# Patient Record
Sex: MALE | Race: WHITE
[De-identification: names, ages, dates, MRNs, and addresses within clinical notes are randomized per-mention and may not be internally consistent; named-entity substitution may affect disease eponyms.]

---

## 2017-02-19 ENCOUNTER — HOSPITAL ENCOUNTER (EMERGENCY)
Dept: HOSPITAL 53 - M ED | Age: 42
Discharge: HOME | End: 2017-02-19
Payer: MEDICAID
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
## 2017-02-19 DIAGNOSIS — S93.622A: ICD-10-CM

## 2017-02-19 DIAGNOSIS — F17.210: ICD-10-CM

## 2017-02-19 DIAGNOSIS — S93.612A: Primary | ICD-10-CM
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
## 2017-02-19 NOTE — REP
Clinical:  Trauma.

 

Technique:  AP, lateral, bilateral oblique views left foot.

 

Comparison 08/19/2015.

 

Findings:  The osseous structures and joint spaces are intact and normal.  There

is no evidence for acute fracture or dislocation.  Surrounding soft tissues are

unremarkable.  No subcutaneous emphysema or radiodense foreign body. Lateral view

demonstrates small calcaneal heal spur.

 

Impression:

 No acute fracture or dislocation.

 

 

Signed by

Floyd Burnham MD 02/19/2017 03:02 P

## 2017-02-19 NOTE — EDDOCDS
Physician Documentation                                                                           

[facility]                                                                         

Name: Wiliam Martinez                                                                                

Age: 41 yrs                                                                                       

Sex: Male                                                                                         

: 1975                                                                                   

MRN: [id number]                                                                                     

Arrival Date: 2017                                                                          

Time: 14:31                                                                                       

Account#: [id number]                                                                              

Bed I6 /                                                                                        

Private MD: No Pcp                                                                                

Disposition:                                                                                      

17 15:35 Discharged to Home/Self Care. Impression: Sprain of tarsal ligament of left        

foot, Sprain of tarsometatarsal ligament of left foot.                                          

- Condition is Stable.                                                                            

- Discharge Instructions: Foot Sprain.                                                            

                                                                                                  

- Medication Reconciliation, Local Pharmacy Hours, Work Release Form - 1 day form.                

- Follow up: Orthopaedics, [facility]; When: Call to arrange an appointment; Reason:           

Further diagnostic work-up, Recheck today's complaints, Continuance of care.                    

- Problem is new.                                                                                 

- Symptoms are unchanged.                                                                         

                                                                                                  

                                                                                                  

                                                                                                  

Historical:                                                                                       

- Allergies: no known allergies;                                                                  

- Home Meds:                                                                                      

1. none                                                                                         

- PMHx: Stroke; Thyroid problem; hernia;                                                          

- PSHx: rt hand surgery;                                                                          

- Social history: Smoking status: Patient uses tobacco products, current every day                

smoker. No barriers to communication noted, The patient speaks fluent English, Speaks           

appropriately for age.                                                                          

- Family history: Not pertinent.                                                                  

- : The pt / caregiver states he / she is not on anticoagulants. Home medication list             

is obtained from the patient.                                                                   

- Exposure Risk Screening:: None identified.                                                      

                                                                                                  

                                                                                                  

Vital Signs:                                                                                      

                                                                                             

14:33  / 71; Pulse 98; Resp 18 S; Temp 97.8(O); Pulse Ox 99% on R/A; Weight 90.72 kg /  gr2 

      200 lbs (R); Height 5 ft. 9 in. (175.26 cm) (R); Pain 9/10;                                 

15:47  / 78; Pulse 91; Resp 18; Temp 97.3; Pulse Ox 99% ; Pain 10/10;                   pml 

14:33 Body Mass Index 29.53 (90.72 kg, 175.26 cm)                                             gr2 

                                                                                                  

MDM:                                                                                              

14:40 Foot, Complete Ordered.                                                                 EDMS

15:35 Ace Wrap ordered.                                                                       btw 

                                                                                                  

Signatures:                                                                                       

Dispatcher [employer]                           EDMS                                                 

Hanna Hargrove RN RN srm [place], [place], PA PA   btw                                                  

Shelia Flor RN RN   pml                                                  

                                                                                                  

**************************************************************************************************

MTDD

## 2017-02-19 NOTE — EDDOCDS
Nurse's Notes                                                                                     

[facility]                                                                         

Name: Wiliam Martinez                                                                                

Age: 41 yrs                                                                                       

Sex: Male                                                                                         

: 1975                                                                                   

MRN: [id number]                                                                                     

Arrival Date: 2017                                                                          

Time: 14:31                                                                                       

Account#: [id number]                                                                              

Bed I6 / 28                                                                                       

Private MD: No Pcp                                                                                

Diagnosis: Sprain of tarsal ligament of left foot;Sprain of tarsometatarsal ligament of left foot 

                                                                                                  

Presentation:                                                                                     

                                                                                             

14:36 Presenting complaint: Patient states: fell yesterday injuring left foot. Adult Sepsis   srm 

      Screening: The patient does not have new or worsening altered mentation. Patient's          

      respiratory rate is less than 22. Systolic blood pressure is greater than 100. Patient      

      has a qSOFA score of 0- Negative Sepsis Screen. Suicide/Homicide risk assessment- the       

      patient denies having any suicidal and/or homicidal ideations and does not present with     

      any other emotional, behavioral or mental health complaints.  Status: Patient       

      is not a  or  dependent. Transition of care: patient was not          

      received from another setting of care.                                                      

14:36 Acuity: ALEAH Level 4                                                                     srm 

14:36 Method Of Arrival: Walkin/Carried/Asstd                                                 srm 

                                                                                                  

Triage Assessment:                                                                                

14:37 General: Appears in no apparent distress, Behavior is appropriate for age, cooperative. srm 

      Pain: Pain currently is 10 out of 10 on a pain scale. HIV screening NA for this visit       

      Offered previously. Musculoskeletal: Circulation, motion, and sensation intact              

      Capillary refill < 3 seconds in left toes.                                                  

14:39 Musculoskeletal: no swelling or bruising noted to left foot.                            srm 

                                                                                                  

Historical:                                                                                       

- Allergies: no known allergies;                                                                  

- Home Meds:                                                                                      

1. none                                                                                         

- PMHx: Stroke; Thyroid problem; hernia;                                                          

- PSHx: rt hand surgery;                                                                          

- Social history: Smoking status: Patient uses tobacco products, current every day                

smoker. No barriers to communication noted, The patient speaks fluent English, Speaks           

appropriately for age.                                                                          

- Family history: Not pertinent.                                                                  

- : The pt / caregiver states he / she is not on anticoagulants. Home medication list             

is obtained from the patient.                                                                   

- Exposure Risk Screening:: None identified.                                                      

                                                                                                  

                                                                                                  

Screening:                                                                                        

15:47 Screening information is obtained from the patient. Fall risk: No risks identified.     pml 

      Assistance ADL's: requires no assistance with activities of daily living. Abuse/DV          

      Screen: The patient / caregiver reports he/she is: not in a situation that causes fear,     

      pain or injury. Nutritional screening: No deficits noted. Advance Directives:               

      Currently, there is no health care proxy. home support is adequate.                         

                                                                                                  

Assessment:                                                                                       

15:47 General: Appears in no apparent distress, comfortable, Behavior is appropriate for age, pml 

      cooperative. Pain: Location: left foot Pain currently is 10 out of 10 on a pain scale.      

      Neurological: Level of Consciousness is awake, alert, Oriented to person, place, time.      

      Cardiovascular: Capillary refill < 3 seconds. Respiratory: Airway is patent Respiratory     

      effort is even, unlabored, Respiratory pattern is regular, symmetrical. GI: Abdomen is      

      non- distended. Derm: Skin is pink, warm & dry. Musculoskeletal: Circulation, motion,     

      and sensation intact Capillary refill < 3 seconds No deformity noted Swelling absent.       

                                                                                                  

Vital Signs:                                                                                      

14:33  / 71; Pulse 98; Resp 18 S; Temp 97.8(O); Pulse Ox 99% on R/A; Weight 90.72 kg    gr2 

      (R); Height 5 ft. 9 in. (175.26 cm) (R); Pain 9/10;                                         

15:47  / 78; Pulse 91; Resp 18; Temp 97.3; Pulse Ox 99% ; Pain 10/10;                   pml 

14:33 Body Mass Index 29.53 (90.72 kg, 175.26 cm)                                             gr2 

                                                                                                  

Vitals:                                                                                           

14:33 Log In Time: 2017 at 14:33.                                                gr2 

                                                                                                  

ED Course:                                                                                        

14:32 Patient visited by Jolanta Martini.                                                   gr2 

14:32 Patient moved to Waiting                                                                gr2 

14:33 No Pcp is Private Physician.                                                            gr2 

14:35 Patient visited by Jolanta Martini.                                                   gr2 

14:35 Patient moved to Pre RCE                                                                gr2 

14:37 Triage Initiated                                                                        srm 

15:14 Raman Tanner PA is PHCP.                                                         btw 

15:14 Jamee Seymour MD is Attending Physician.                                               btw 

15:14 Patient moved to I                                                                btw 

15:34 Patient visited by Raman Tanner PA.                                              btw 

[facility]                                      [facility] 

15:47 The patient / caregiver is instructed regarding the plan of care and ED course. Patient pml 

      has correct armband on for positive identification. Bed in low position. Side rails up      

      X2.                                                                                         

15:48 Foot, Complete Returned.                                                                EDMS

15:49 No IV's were initiated during this patient's visit. No procedures done that require     pml 

      assistance.                                                                                 

                                                                                                  

Order Results:                                                                                    

                                                                                                  

Radiology Order: Foot, Complete                                                                   

      Test: Foot, Complete                                                                        

      REASON FOR EXAMINATION: Trauma; Clinical: Trauma.; ; Technique: AP, lateral, bilateral      

      oblique views left foot.; ; Comparison 2015.; ; Findings: The osseous structures      

      and joint spaces are intact and normal. There; is no evidence for acute fracture or         

      dislocation. Surrounding soft tissues are; unremarkable. No subcutaneous emphysema or       

      radiodense foreign body. Lateral view; demonstrates small calcaneal heal spur.; ;           

      Impression:; No acute fracture or dislocation.; ; ; Signed by; Floyd Burnham MD            

      2017 03:02 P;                                                                         

Outcome:                                                                                          

15:35 Discharge ordered by Provider.                                                          btw 

15:49 Discharge Assessment: Patient awake, alert and oriented x 3. No cognitive and/or        pml 

      functional deficits noted. Patient verbalized understanding of disposition                  

      instructions. patient administered narcotics - no. The following High Risk Discharge        

      criteria are identified: None. Discharged to home ambulatory. Condition: good               

      Condition: stable. Discharge instructions given to patient, Instructed on discharge         

      instructions, follow up and referral plans. Rest, Ice, Compression and Elevation.           

      Demonstrated understanding of instructions, Pt was receptive of discharge instructions/     

      teaching. No special radiology studies were completed. Property sent home with patient.     

15:50 Patient left the ED.                                                                    pml 

                                                                                                  

Signatures:                                                                                       

Dispatcher [employer]                           EDMS                                                 

Hanna Hargrove, RN                    Raman Harris PA PA   btw                                                  

Shelia Flor RN RN pml Raymond, Gainslee                            gr2                                                  

                                                                                                  

**************************************************************************************************

MTDLINA

## 2017-02-21 NOTE — EDDOCDS
Nurse's Notes                                                                                     

[facility]                                                                         

Name: Wiliam Martinez                                                                                

Age: 41 yrs                                                                                       

Sex: Male                                                                                         

: 1975                                                                                   

MRN: [id number]                                                                                     

Arrival Date: 2017                                                                          

Time: 14:31                                                                                       

Account#: [id number]                                                                              

Bed I6 / 28                                                                                       

Private MD: No Pcp                                                                                

Diagnosis: Sprain of tarsal ligament of left foot;Sprain of tarsometatarsal ligament of left foot 

                                                                                                  

Presentation:                                                                                     

                                                                                             

14:36 Presenting complaint: Patient states: fell yesterday injuring left foot. Adult Sepsis   srm 

      Screening: The patient does not have new or worsening altered mentation. Patient's          

      respiratory rate is less than 22. Systolic blood pressure is greater than 100. Patient      

      has a qSOFA score of 0- Negative Sepsis Screen. Suicide/Homicide risk assessment- the       

      patient denies having any suicidal and/or homicidal ideations and does not present with     

      any other emotional, behavioral or mental health complaints.  Status: Patient       

      is not a  or  dependent. Transition of care: patient was not          

      received from another setting of care.                                                      

14:36 Acuity: ALEAH Level 4                                                                     srm 

14:36 Method Of Arrival: Walkin/Carried/Asstd                                                 srm 

                                                                                                  

Triage Assessment:                                                                                

14:37 General: Appears in no apparent distress, Behavior is appropriate for age, cooperative. srm 

      Pain: Pain currently is 10 out of 10 on a pain scale. HIV screening NA for this visit       

      Offered previously. Musculoskeletal: Circulation, motion, and sensation intact              

      Capillary refill < 3 seconds in left toes.                                                  

14:39 Musculoskeletal: no swelling or bruising noted to left foot.                            srm 

                                                                                                  

Historical:                                                                                       

- Allergies: no known allergies;                                                                  

- Home Meds:                                                                                      

1. none                                                                                         

- PMHx: Stroke; Thyroid problem; hernia;                                                          

- PSHx: rt hand surgery;                                                                          

- Social history: Smoking status: Patient uses tobacco products, current every day                

smoker. No barriers to communication noted, The patient speaks fluent English, Speaks           

appropriately for age.                                                                          

- Family history: Not pertinent.                                                                  

- : The pt / caregiver states he / she is not on anticoagulants. Home medication list             

is obtained from the patient.                                                                   

- Exposure Risk Screening:: None identified.                                                      

                                                                                                  

                                                                                                  

Screening:                                                                                        

15:47 Screening information is obtained from the patient. Fall risk: No risks identified.     pml 

      Assistance ADL's: requires no assistance with activities of daily living. Abuse/DV          

      Screen: The patient / caregiver reports he/she is: not in a situation that causes fear,     

      pain or injury. Nutritional screening: No deficits noted. Advance Directives:               

      Currently, there is no health care proxy. home support is adequate.                         

                                                                                                  

Assessment:                                                                                       

15:47 General: Appears in no apparent distress, comfortable, Behavior is appropriate for age, pml 

      cooperative. Pain: Location: left foot Pain currently is 10 out of 10 on a pain scale.      

      Neurological: Level of Consciousness is awake, alert, Oriented to person, place, time.      

      Cardiovascular: Capillary refill < 3 seconds. Respiratory: Airway is patent Respiratory     

      effort is even, unlabored, Respiratory pattern is regular, symmetrical. GI: Abdomen is      

      non- distended. Derm: Skin is pink, warm & dry. Musculoskeletal: Circulation, motion,     

      and sensation intact Capillary refill < 3 seconds No deformity noted Swelling absent.       

                                                                                                  

Vital Signs:                                                                                      

14:33  / 71; Pulse 98; Resp 18 S; Temp 97.8(O); Pulse Ox 99% on R/A; Weight 90.72 kg    gr2 

      (R); Height 5 ft. 9 in. (175.26 cm) (R); Pain 9/10;                                         

15:47  / 78; Pulse 91; Resp 18; Temp 97.3; Pulse Ox 99% ; Pain 10/10;                   pml 

14:33 Body Mass Index 29.53 (90.72 kg, 175.26 cm)                                             gr2 

                                                                                                  

Vitals:                                                                                           

14:33 Log In Time: 2017 at 14:33.                                                gr2 

                                                                                                  

ED Course:                                                                                        

14:32 Patient visited by Jolanta Martini.                                                   gr2 

14:32 Patient moved to Waiting                                                                gr2 

14:33 No Pcp is Private Physician.                                                            gr2 

14:35 Patient visited by Jolanta Martini.                                                   gr2 

14:35 Patient moved to Pre RCE                                                                gr2 

14:37 Triage Initiated                                                                        srm 

15:14 Raman Tanner PA is PHCP.                                                         btw 

15:14 Jamee Seymour MD is Attending Physician.                                               btw 

15:14 Patient moved to I                                                                btw 

15:34 Patient visited by Raman Tanner PA.                                              btw 

[facility]                                      [facility] 

15:47 The patient / caregiver is instructed regarding the plan of care and ED course. Patient pml 

      has correct armband on for positive identification. Bed in low position. Side rails up      

      X2.                                                                                         

15:48 Foot, Complete Returned.                                                                EDMS

15:49 No IV's were initiated during this patient's visit. No procedures done that require     pml 

      assistance.                                                                                 

16:25 [facility] Payment Agreement was scanned into [employer] and attached to record.               [facility] 

21:19 T-Sheet-- Draft Copy was scanned into [employer] and attached to record.                   klr 

                                                                                                  

Order Results:                                                                                    

                                                                                                  

Radiology Order: Foot, Complete                                                                   

      Test: Foot, Complete                                                                        

      REASON FOR EXAMINATION: Trauma; Clinical: Trauma.; ; Technique: AP, lateral, bilateral      

      oblique views left foot.; ; Comparison 2015.; ; Findings: The osseous structures      

      and joint spaces are intact and normal. There; is no evidence for acute fracture or         

      dislocation. Surrounding soft tissues are; unremarkable. No subcutaneous emphysema or       

      radiodense foreign body. Lateral view; demonstrates small calcaneal heal spur.; ;           

      Impression:; No acute fracture or dislocation.; ; ; Signed by; Floyd Burnham MD            

      2017 03:02 P;                                                                         

Outcome:                                                                                          

15:35 Discharge ordered by Provider.                                                          [facility] 

15:49 Discharge Assessment: Patient awake, alert and oriented x 3. No cognitive and/or        pml 

      functional deficits noted. Patient verbalized understanding of disposition                  

      instructions. patient administered narcotics - no. The following High Risk Discharge        

      criteria are identified: None. Discharged to home ambulatory. Condition: good               

      Condition: stable. Discharge instructions given to patient, Instructed on discharge         

      instructions, follow up and referral plans. Rest, Ice, Compression and Elevation.           

      Demonstrated understanding of instructions, Pt was receptive of discharge instructions/     

      teaching. No special radiology studies were completed. Property sent home with patient.     

15:50 Patient left the ED.                                                                    pml 

                                                                                                  

Signatures:                                                                                       

Dispatcher [facility]                           EDMS                                                 

Hanna Hargrove RN RN srm Wolfenden, Brandon, PA PA   btw                                                  

Shelia Flor RN RN pml Raymond, Gainslee gr2                                                  

Courtney Valentin Kathie klr                                                  

                                                                                                  

**************************************************************************************************



*** Chart Complete ***
MTDD

## 2017-02-21 NOTE — EDDOCDS
Physician Documentation                                                                           

[facility]                                                                         

Name: Wiliam Martinez                                                                                

Age: 41 yrs                                                                                       

Sex: Male                                                                                         

: 1975                                                                                   

MRN: [id number]                                                                                     

Arrival Date: 2017                                                                          

Time: 14:31                                                                                       

Account#: [id number]                                                                              

Bed I6 /                                                                                        

Private MD: No Pcp                                                                                

Disposition:                                                                                      

17 15:35 Discharged to Home/Self Care. Impression: Sprain of tarsal ligament of left        

foot, Sprain of tarsometatarsal ligament of left foot.                                          

- Condition is Stable.                                                                            

- Discharge Instructions: Foot Sprain.                                                            

                                                                                                  

- Medication Reconciliation, Local Pharmacy Hours, Work Release Form - 1 day form.                

- Follow up: Orthopaedics, [facility]; When: Call to arrange an appointment; Reason:           

Further diagnostic work-up, Recheck today's complaints, Continuance of care.                    

- Problem is new.                                                                                 

- Symptoms are unchanged.                                                                         

                                                                                                  

                                                                                                  

                                                                                                  

Historical:                                                                                       

- Allergies: no known allergies;                                                                  

- Home Meds:                                                                                      

1. none                                                                                         

- PMHx: Stroke; Thyroid problem; hernia;                                                          

- PSHx: rt hand surgery;                                                                          

- Social history: Smoking status: Patient uses tobacco products, current every day                

smoker. No barriers to communication noted, The patient speaks fluent English, Speaks           

appropriately for age.                                                                          

- Family history: Not pertinent.                                                                  

- : The pt / caregiver states he / she is not on anticoagulants. Home medication list             

is obtained from the patient.                                                                   

- Exposure Risk Screening:: None identified.                                                      

                                                                                                  

                                                                                                  

Vital Signs:                                                                                      

                                                                                             

14:33  / 71; Pulse 98; Resp 18 S; Temp 97.8(O); Pulse Ox 99% on R/A; Weight 90.72 kg /  gr2 

      200 lbs (R); Height 5 ft. 9 in. (175.26 cm) (R); Pain 9/10;                                 

15:47  / 78; Pulse 91; Resp 18; Temp 97.3; Pulse Ox 99% ; Pain 10/10;                   pml 

14:33 Body Mass Index 29.53 (90.72 kg, 175.26 cm)                                             gr2 

                                                                                                  

MDM:                                                                                              

14:40 Foot, Complete Ordered.                                                                 EDMS

15:35 Ace Wrap ordered.                                                                       btw 

16:25 [facility] Payment Agreement was scanned into [employer] and attached to record.               b 

16:25 Financial registration complete.                                                        gjb 

21:19 T-Sheet-- Draft Copy was scanned into [employer] and attached to record.                   klr 

                                                                                                  

Signatures:                                                                                       

Dispatcher [employer]                           Hanna Santiago RN RN srm Wolfenden, Brandon, PA PA btw Quay, Paulina, RN RN pml Beck, Gabriela gjb Redder, Kathie klr                                                  

                                                                                                  

The chart was reviewed and I authenticate all verbal orders and agree with the evaluation and 
treatment provided.Attachments:

16:25 [facility] Payment Agreement                                                                gjfred 

21:19 T-Sheet-- Draft Copy                                                                    klr 

                                                                                                  

**************************************************************************************************



*** Chart Complete ***
MTDD

## 2017-02-21 NOTE — EDDOCDS
Physician Documentation                                                                           

[facility]                                                                         

Name: Wiliam Martinez                                                                                

Age: 41 yrs                                                                                       

Sex: Male                                                                                         

: 1975                                                                                   

MRN: [id number]                                                                                     

Arrival Date: 2017                                                                          

Time: 14:31                                                                                       

Account#: [id number]                                                                              

Bed I6 /                                                                                        

Private MD: No Pcp                                                                                

Disposition:                                                                                      

17 15:35 Discharged to Home/Self Care. Impression: Sprain of tarsal ligament of left        

foot, Sprain of tarsometatarsal ligament of left foot.                                          

- Condition is Stable.                                                                            

- Discharge Instructions: Foot Sprain.                                                            

                                                                                                  

- Medication Reconciliation, Local Pharmacy Hours, Work Release Form - 1 day form.                

- Follow up: Orthopaedics, [facility]; When: Call to arrange an appointment; Reason:           

Further diagnostic work-up, Recheck today's complaints, Continuance of care.                    

- Problem is new.                                                                                 

- Symptoms are unchanged.                                                                         

                                                                                                  

                                                                                                  

                                                                                                  

Historical:                                                                                       

- Allergies: no known allergies;                                                                  

- Home Meds:                                                                                      

1. none                                                                                         

- PMHx: Stroke; Thyroid problem; hernia;                                                          

- PSHx: rt hand surgery;                                                                          

- Social history: Smoking status: Patient uses tobacco products, current every day                

smoker. No barriers to communication noted, The patient speaks fluent English, Speaks           

appropriately for age.                                                                          

- Family history: Not pertinent.                                                                  

- : The pt / caregiver states he / she is not on anticoagulants. Home medication list             

is obtained from the patient.                                                                   

- Exposure Risk Screening:: None identified.                                                      

                                                                                                  

                                                                                                  

Vital Signs:                                                                                      

                                                                                             

14:33  / 71; Pulse 98; Resp 18 S; Temp 97.8(O); Pulse Ox 99% on R/A; Weight 90.72 kg /  gr2 

      200 lbs (R); Height 5 ft. 9 in. (175.26 cm) (R); Pain 9/10;                                 

15:47  / 78; Pulse 91; Resp 18; Temp 97.3; Pulse Ox 99% ; Pain 10/10;                   pml 

14:33 Body Mass Index 29.53 (90.72 kg, 175.26 cm)                                             gr2 

                                                                                                  

MDM:                                                                                              

14:40 Foot, Complete Ordered.                                                                 EDMS

15:35 Ace Wrap ordered.                                                                       btw 

16:25 [facility] Payment Agreement was scanned into [employer] and attached to record.               b 

16:25 Financial registration complete.                                                        gjb 

21:19 T-Sheet-- Draft Copy was scanned into [employer] and attached to record.                   klr 

                                                                                                  

Signatures:                                                                                       

Dispatcher [employer]                           Hanna Santiago RN RN srm Wolfenden, Brandon, PA PA btw Quay, Paulina, RN RN pml Beck, Gabriela gjb Redder, Kathie klr                                                  

                                                                                                  

The chart was reviewed and I authenticate all verbal orders and agree with the evaluation and 
treatment provided.Attachments:

16:25 [facility] Payment Agreement                                                                gjfred 

21:19 T-Sheet-- Draft Copy                                                                    klr 

                                                                                                  

**************************************************************************************************



*** Chart Complete ***
MTDD

## 2018-08-31 ENCOUNTER — HOSPITAL ENCOUNTER (EMERGENCY)
Dept: HOSPITAL 53 - M ED | Age: 43
Discharge: HOME | End: 2018-08-31
Payer: SELF-PAY
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
## 2018-08-31 DIAGNOSIS — Z98.890: ICD-10-CM
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
## 2018-08-31 DIAGNOSIS — F17.210: ICD-10-CM

## 2018-08-31 DIAGNOSIS — S63.121A: Primary | ICD-10-CM

## 2018-08-31 LAB
ANION GAP: 5 MEQ/L (ref 8–16)
BASO #: 0.1 10^3/UL (ref 0–0.2)
BASO %: 0.7 % (ref 0–1)
BLOOD UREA NITROGEN: 12 MG/DL (ref 7–18)
CALCIUM LEVEL: 8.9 MG/DL (ref 8.5–10.1)
CARBON DIOXIDE LEVEL: 30 MEQ/L (ref 21–32)
CHLORIDE LEVEL: 107 MEQ/L (ref 98–107)
CREATININE FOR GFR: 1.15 MG/DL (ref 0.7–1.3)
EOS #: 0.2 10^3/UL (ref 0–0.5)
EOSINOPHIL NFR BLD AUTO: 1.9 % (ref 0–3)
GFR SERPL CREATININE-BSD FRML MDRD: > 60 ML/MIN/{1.73_M2} (ref 60–?)
GLUCOSE, FASTING: 76 MG/DL (ref 70–100)
HEMATOCRIT: 43.6 % (ref 42–52)
HEMOGLOBIN: 14.6 G/DL (ref 13.5–17.5)
IMMATURE GRANULOCYTE %: 0.2 % (ref 0–3)
LYMPH #: 3 10^3/UL (ref 1.5–4.5)
LYMPH %: 31.4 % (ref 24–44)
MEAN CORPUSCULAR HEMOGLOBIN: 31.7 PG (ref 27–33)
MEAN CORPUSCULAR HGB CONC: 33.5 G/DL (ref 32–36.5)
MEAN CORPUSCULAR VOLUME: 94.8 FL (ref 80–96)
MONO #: 1.1 10^3/UL (ref 0–0.8)
MONO %: 11.2 % (ref 0–5)
NEUTROPHILS #: 5.2 10^3/UL (ref 1.8–7.7)
NEUTROPHILS %: 54.6 % (ref 36–66)
NRBC BLD AUTO-RTO: 0 % (ref 0–0)
PLATELET COUNT, AUTOMATED: 258 10^3/UL (ref 150–450)
POTASSIUM SERUM: 3.7 MEQ/L (ref 3.5–5.1)
RED BLOOD COUNT: 4.6 10^6/UL (ref 4.3–6.1)
RED CELL DISTRIBUTION WIDTH: 12.5 % (ref 11.5–14.5)
SODIUM LEVEL: 142 MEQ/L (ref 136–145)
WHITE BLOOD COUNT: 9.6 10^3/UL (ref 4–10)

## 2018-08-31 RX ADMIN — MORPHINE SULFATE 1 MG: 4 INJECTION INTRAVENOUS at 20:13

## 2018-08-31 RX ADMIN — HYDROCODONE BITARTRATE AND ACETAMINOPHEN 1 TAB: 5; 325 TABLET ORAL at 21:45

## 2018-08-31 RX ADMIN — LIDOCAINE HYDROCHLORIDE 1 ML: 20 INJECTION, SOLUTION INFILTRATION; PERINEURAL at 20:14

## 2018-08-31 RX ADMIN — CEFAZOLIN SODIUM 1 MLS/HR: 1 INJECTION, POWDER, FOR SOLUTION INTRAMUSCULAR; INTRAVENOUS at 19:21

## 2019-02-26 ENCOUNTER — HOSPITAL ENCOUNTER (OUTPATIENT)
Dept: HOSPITAL 53 - M LAB REF | Age: 44
End: 2019-02-26
Attending: FAMILY MEDICINE
Payer: MEDICAID

## 2019-02-26 DIAGNOSIS — Z00.01: ICD-10-CM

## 2019-02-26 DIAGNOSIS — Z86.73: Primary | ICD-10-CM

## 2019-02-26 LAB
ALBUMIN SERPL BCG-MCNC: 4 GM/DL (ref 3.2–5.2)
ALT SERPL W P-5'-P-CCNC: 41 U/L (ref 12–78)
BASOPHILS # BLD AUTO: 0.1 10^3/UL (ref 0–0.2)
BASOPHILS NFR BLD AUTO: 0.8 % (ref 0–1)
BILIRUB SERPL-MCNC: 0.2 MG/DL (ref 0.2–1)
BUN SERPL-MCNC: 14 MG/DL (ref 7–18)
CALCIUM SERPL-MCNC: 8.7 MG/DL (ref 8.5–10.1)
CHLORIDE SERPL-SCNC: 106 MEQ/L (ref 98–107)
CHOLEST SERPL-MCNC: 222 MG/DL (ref ?–200)
CHOLEST/HDLC SERPL: 5.04 {RATIO} (ref ?–5)
CO2 SERPL-SCNC: 28 MEQ/L (ref 21–32)
CREAT SERPL-MCNC: 0.89 MG/DL (ref 0.7–1.3)
EOSINOPHIL # BLD AUTO: 0.2 10^3/UL (ref 0–0.5)
EOSINOPHIL NFR BLD AUTO: 1.7 % (ref 0–3)
EST. AVERAGE GLUCOSE BLD GHB EST-MCNC: 120 MG/DL (ref 60–110)
GFR SERPL CREATININE-BSD FRML MDRD: > 60 ML/MIN/{1.73_M2} (ref 60–?)
GLUCOSE SERPL-MCNC: 89 MG/DL (ref 70–100)
HCT VFR BLD AUTO: 53.1 % (ref 42–52)
HDLC SERPL-MCNC: 44 MG/DL (ref 40–?)
HGB BLD-MCNC: 17.8 G/DL (ref 13.5–17.5)
LDLC SERPL CALC-MCNC: 129 MG/DL (ref ?–100)
LYMPHOCYTES # BLD AUTO: 3.2 10^3/UL (ref 1.5–4.5)
LYMPHOCYTES NFR BLD AUTO: 29 % (ref 24–44)
MCH RBC QN AUTO: 30.3 PG (ref 27–33)
MCHC RBC AUTO-ENTMCNC: 33.5 G/DL (ref 32–36.5)
MCV RBC AUTO: 90.5 FL (ref 80–96)
MONOCYTES # BLD AUTO: 0.9 10^3/UL (ref 0–0.8)
MONOCYTES NFR BLD AUTO: 7.9 % (ref 0–5)
NEUTROPHILS # BLD AUTO: 6.6 10^3/UL (ref 1.8–7.7)
NEUTROPHILS NFR BLD AUTO: 60.2 % (ref 36–66)
NONHDLC SERPL-MCNC: 178 MG/DL
PLATELET # BLD AUTO: 329 10^3/UL (ref 150–450)
POTASSIUM SERPL-SCNC: 4.8 MEQ/L (ref 3.5–5.1)
PROT SERPL-MCNC: 8.3 GM/DL (ref 6.4–8.2)
RBC # BLD AUTO: 5.87 10^6/UL (ref 4.3–6.1)
SODIUM SERPL-SCNC: 139 MEQ/L (ref 136–145)
T4 FREE SERPL-MCNC: 0.92 NG/DL (ref 0.76–1.46)
TRIGL SERPL-MCNC: 247 MG/DL (ref ?–150)
TSH SERPL DL<=0.005 MIU/L-ACNC: 2.11 UIU/ML (ref 0.36–3.74)
WBC # BLD AUTO: 10.9 10^3/UL (ref 4–10)

## 2019-02-27 LAB — 25(OH)D3 SERPL-MCNC: 14.5 NG/ML (ref 30–100)

## 2019-03-01 LAB
B BURGDOR IGG+IGM SER-ACNC: <0.91 ISR (ref 0–0.9)
B BURGDOR IGM SER IA-ACNC: <0.8 INDEX (ref 0–0.79)

## 2021-02-11 ENCOUNTER — HOSPITAL ENCOUNTER (EMERGENCY)
Dept: HOSPITAL 53 - M ED | Age: 46
LOS: 1 days | Discharge: HOME | End: 2021-02-12
Payer: COMMERCIAL

## 2021-02-11 VITALS — BODY MASS INDEX: 30.04 KG/M2 | HEIGHT: 69 IN | WEIGHT: 202.83 LBS

## 2021-02-11 DIAGNOSIS — F41.9: ICD-10-CM

## 2021-02-11 DIAGNOSIS — F33.9: ICD-10-CM

## 2021-02-11 DIAGNOSIS — F12.20: ICD-10-CM

## 2021-02-11 DIAGNOSIS — J20.9: Primary | ICD-10-CM

## 2021-02-11 DIAGNOSIS — J45.909: ICD-10-CM

## 2021-02-11 DIAGNOSIS — F17.210: ICD-10-CM

## 2021-02-11 NOTE — CCD
Patient Summary Document

                             Created on: 2020



Wiliam Martinez

External Reference #: [id number]

: 1975

Sex: Male



Demographics





                          Address                   [address] [place] [address]

 

                          Home Phone                [phone]

 

                          Preferred Language        Unknown

 

                          Marital Status            Unknown

 

                          Baptism Affiliation     Unknown

 

                          Race                      Unknown

 

                          Ethnic Group              Unknown





Author





                          Author                    Wiliam Gibson

 

                          Organization              Unknown

 

                          Address                   [address]  [address]



 

                          Phone                     [phone]







Care Team Providers





                    Care Team Member Name Role                Phone

 

                    Bill Gibson     PCP                 [phone]







Chief Complaint and Reason for Visit





                                        Chief Complaint

 

                                         







Allergies, Adverse Reactions, Alerts





        Concept Allergy Name Reaction Severity Onset Date Status  Documentation 

Date 

Phone Number Npid         Taxonomy Code Taxonomy Desc Author Last Name Author Fi

rst Name

                                        Concept Type

 

     073320 nkda                Active 2018                               

RXNORM







Problem List





          Concept   Problem Description Status    Start Date Created Date Resolv

ed Date Snomed 

Code

 

                F28             Other Specified Schizophrenia Spectrum and Other

 Psychotic Disorder Active           

                    2020                               

 

          F17.200   Tobacco Use Disorder, Moderate Active              

0            

 

          F12.10    Cannabis Use Disorder, Mild Active              2020  

          







Medications





                                        No Data in Section







Social History





                Social History Element Description     Concept         Effective

 Date

 

                Smoking Status  Unknown if ever smoked [id number]       [id number]







Immunizations





                                        No Data in Section







Vital Signs





                                        No Data in Section







Procedures





           Date       Concept Id Description Targeted Site Concept Targeted Site

 Concept Type

 

           2020 [id number]      Brief Individual Psychotherapy - 30 min        

               CPT







                                        Patient has no history of implantable de

vices







Encounters





          Encounter Start Date End Date  Encounter Type Description Diagnosis Di

agnosis 

Desc       Location   Author First Name Author Last Name Npid       Taxonomy Cod

e Taxonomy 

Desc       Phone Number Location Addr1 Location Addr2 Location [facility] Location [facility] 

Location [facility]

 

          [id number]    2020 [address]     Brief Individual Psychothera

py - 30 min F28       

Other Specified Schizophrenia Spectrum and Other Psychotic Disorder [facility] 

Clinic [facility]     [id number] [id number] Social Wo

rker 

[id number]   [address]           [phone]







Plan of Treatment 





                                        No Data in Section



                                        



Lab Results





                                        No Data in Section







Instructions





                                        No Data in Section







Insurance Providers





                Insurance Id    Policy Effective Date Policy Thru Date Company N

[employer]

 

                [id number]        2020                      [employer]

## 2021-02-11 NOTE — CCD
Patient Summary Document

                             Created on: 2021



Wiliam Martinez

External Reference #: [id number]

: 1975

Sex: Male



Demographics





                          Address                   [address] [place] [address]  [address]

 

                          Home Phone                [phone]

 

                          Preferred Language        Unknown

 

                          Marital Status            Unknown

 

                          Quaker Affiliation     Unknown

 

                          Race                      Unknown

 

                          Ethnic Group              Unknown





Author





                          Author                    Wiliam Gibson

 

                          Organization              Unknown

 

                          Address                   [address]  [address]



 

                          Phone                     [phone]







Care Team Providers





                    Care Team Member Name Role                Phone

 

                    Bill Gibson     PCP                 [phone]







Allergies, Adverse Reactions, Alerts





        Concept Allergy Name Reaction Severity Onset Date Status  Documentation 

Date 

Phone Number Npid         Taxonomy Code Taxonomy Desc Author Last Name Author Stephen

rst Name

                                        Concept Type

 

     367912 nkda                Active 2018                               

RXNORM







Problem List





          Concept   Problem Description Status    Start Date Created Date Resolv

ed Date Snomed 

Code

 

                F28             Other Specified Schizophrenia Spectrum and Other

 Psychotic Disorder Active           

                    2021                               

 

          F17.200   Tobacco Use Disorder, Moderate Active                          

 

          F12.10    Cannabis Use Disorder, Mild Active              2021  

          







Medications





                                        No Data in Section







Social History





                Social History Element Description     Concept         Effective

 Date

 

                Smoking Status  Unknown if ever smoked [id number]       [id number]







Immunizations





                                        No Data in Section







Vital Signs





                                        No Data in Section







Procedures





           Date       Concept Id Description Targeted Site Concept Targeted Site

 Concept Type

 

           2021 [id number]      Extended Individual Psychotherapy  - 45  min   

                    CPT







                                        Patient has no history of implantable de

vices







Encounters





          Encounter Start Date End Date  Encounter Type Description Diagnosis Di

agnosis 

Desc       Location   Author First Name Author Last Name Npid       Taxonomy Cod

e Taxonomy 

Desc       Phone Number Location Addr1 Location Addr2 Location [facility] Location Sta

 

Location [facility]

 

           [id number]     2021 [address]      Extended Individual Psych

otherapy  - 45  min 

F28                       Other Specified Schizophrenia Spectrum and Other Psych

otic Disorder 

[facility]       [phone]   1041

[id number]   Social

Worker     [phone] [address]         1

[phone]







Plan of Treatment 





                                        No Data in Section



                                        



Lab Results





                                        No Data in Section







Instructions





                                        No Data in Section







Insurance Providers





                Insurance Id    Policy Effective Date Policy Thru Date Company N

[employer]vin

 

                [id number]       2021                      [employer]

ID MANAGED

## 2021-02-11 NOTE — CCD
Summarization Of Episode

                             Created on: 2021



JOSIE KWOK

External Reference #: [id number]

: 1975

Sex: Male



Demographics





                          Address                   [address] [place] [address]

 

                          Home Phone                [phone]

 

                          Preferred Language        English

 

                          Marital Status            Single

 

                          Christian Affiliation     [place]

 

                          Race                      White

 

                          Ethnic Group              Not  or 





Author





                          Author                    [facility]

 

                          Organization              [facility]

 

                          Address                   Unknown

 

                          Phone                     Unavailable







Support





                Name            Relationship    Address         Phone

 

                    Judie José MD   Next Of Kin         [address] [place] [address]                    [phone]

 

                    Jose Blancas MD    Next Of Kin         [address] [place] [address]                    [phone]

 

                    Jing Mendoza DDS   Next Of Kin         [address] [place], [place] [id number] [phone]

 

                    [employer] Next Of Kin         [address] [place] [address]                    [phone]

 

                UE              Next Of Kin     Unknown         Unavailable

 

                    PAOLA HOLLAND      Next Of Kin         [address] [place] [address]                    [phone]







Care Team Providers





                    Care Team Member Name Role                Phone

 

                    LaBarge,  Bill    Unavailable         [phone]

 

                    MAURICIO JOSÉ MD  Unavailable         Unavailable

 

                    MAURICIO JOSÉ MD  Unavailable         Unavailable

 

                    MAURICIO JOSÉ MD  Unavailable         Unavailable

 

                    MAURICIO JOSÉ MD  Unavailable         Unavailable

 

                    MAURICIO JOSÉ MD  Unavailable         Unavailable

 

                    MAURICIO JOSÉ MD  Unavailable         Unavailable

 

                    MAURICIO JOSÉ MD  Unavailable         Unavailable

 

                    MAURICIO JOSÉ MD  Unavailable         Unavailable

 

                    MAURICIO JOSÉ MD  Unavailable         Unavailable

 

                    MAURICIO JOSÉ MD  Unavailable         Unavailable

 

                    MAURICIO JOSÉ MD  Unavailable         Unavailable

 

                    MAURICIO JOSÉ MD  Unavailable         Unavailable

 

                    MAURICIO JOSÉ MD  Unavailable         Unavailable

 

                    MAURICIO JOSÉ MD  Unavailable         Unavailable

 

                    MAURICIO JOSÉ MD  Unavailable         Unavailable

 

                    MAURICIO JOSÉ MD  Unavailable         Unavailable

 

                    MAURICIO JOSÉ MD  Unavailable         Unavailable

 

                    MAURICIO JOSÉ MD  Unavailable         Unavailable

 

                    MAURICIO JOSÉ MD  Unavailable         Unavailable

 

                    MAURICIO JOSÉ MD  Unavailable         Unavailable

 

                    MAURICIO JOSÉ MD  Unavailable         Unavailable

 

                    MAURICIO JOSÉ MD  Unavailable         Unavailable

 

                    MAURICIO JSOÉ MD  Unavailable         Unavailable

 

                    MAURICIO JOSÉ MD  Unavailable         Unavailable

 

                    MAURICIO JOSÉ MD  Unavailable         Unavailable

 

                    MAURICIO JOSÉ MD  Unavailable         Unavailable

 

                    MAURICIO JOSÉ MD  Unavailable         Unavailable

 

                    MAURICIO JOSÉ MD  Unavailable         Unavailable

 

                    MAURICIO JOSÉ MD  Unavailable         Unavailable

 

                    MAURICIO JOSÉ MD  Unavailable         Unavailable

 

                    MAURICIO JOSÉ MD  Unavailable         Unavailable

 

                    MAURICIO JOSÉ MD  Unavailable         Unavailable

 

                    MAURICIO JOSÉ MD  Unavailable         Unavailable

 

                    MAURICIO JOSÉ MD  Unavailable         Unavailable

 

                    MAURICIO JOSÉ MD  Unavailable         Unavailable

 

                    MAURICIO JOSÉ MD  Unavailable         Unavailable

 

                    MAURICIO JOSÉ MD  Unavailable         Unavailable

 

                    MAURICIO JOSÉ MD  Unavailable         Unavailable

 

                    JANNETTEMAURICIO DOYLE MD  Unavailable         Unavailable

 

                    JANNETTEMAURICIO DOYLE MD  Unavailable         Unavailable

 

                    MAURICIO JOSÉ MD  Unavailable         Unavailable

 

                    MAURICIO JOSÉ MD  Unavailable         Unavailable

 

                    MAURICIO JOSÉ MD  Unavailable         Unavailable

 

                    MAURICIO JOSÉ MD  Unavailable         Unavailable

 

                    MAURICIO JOSÉ MD  Unavailable         Unavailable

 

                    MAURICIO JOSÉ MD  Unavailable         Unavailable

 

                    MAURICIO JOSÉ MD  Unavailable         Unavailable

 

                    MAURICIO JOSÉ MD  Unavailable         Unavailable

 

                    MAURICIO JOSÉ MD  Unavailable         Unavailable

 

                    MAURICIO JOSÉ MD  Unavailable         Unavailable

 

                    MAURICIO JOÉS MD  Unavailable         Unavailable

 

                    MAURICIO JOSÉ MD  Unavailable         Unavailable

 

                    MAURICIO JOSÉ MD  Unavailable         Unavailable

 

                    MAURICIO JOSÉ MD  Unavailable         Unavailable

 

                    MAURICIO JOSÉ MD  Unavailable         Unavailable

 

                    MAURICIO JOSÉ MD  Unavailable         Unavailable

 

                    MAURICIO JOSÉ MD  Unavailable         Unavailable

 

                    MAURICIO JOSÉ MD  Unavailable         Unavailable

 

                    MAURICIO JOSÉ MD  Unavailable         Unavailable

 

                    MAURICIO JOSÉ MD  Unavailable         Unavailable

 

                    MAURICIO JOSÉ MD  Unavailable         Unavailable

 

                    MAURICIO JOSÉ MD  Unavailable         Unavailable

 

                    MAURICIO JOSÉ MD  Unavailable         Unavailable

 

                    MAURICIO JOSÉ MD  Unavailable         Unavailable

 

                    MAURICIO JOSÉ MD  Unavailable         Unavailable

 

                    MAURICIO JOSÉ MD  Unavailable         Unavailable

 

                    MAURICIO JOSÉ MD  Unavailable         Unavailable

 

                    MAURICIO JOSÉ MD  Unavailable         Unavailable

 

                    MAURICIO JOSÉ MD  Unavailable         Unavailable

 

                    MAURICIO JOSÉ MD  Unavailable         Unavailable

 

                    MAURICIO JOSÉ MD  Unavailable         Unavailable

 

                    MAURICIO JOSÉ MD  Unavailable         Unavailable

 

                    MAURICIO JOSÉ MD  Unavailable         Unavailable

 

                    MAURICIO JOSÉ MD  Unavailable         Unavailable

 

                    MAURICIO JOSÉ MD  Unavailable         Unavailable

 

                    MAURICIO JOSÉ MD  Unavailable         Unavailable

 

                    MAURICIO JOSÉ MD  Unavailable         Unavailable

 

                    MAURICIO JOSÉ MD  Unavailable         Unavailable

 

                    MAURICIO JOSÉ MD  Unavailable         Unavailable

 

                    MAURICIO JOSÉ MD  Unavailable         Unavailable

 

                    MAURICIO JOSÉ MD  Unavailable         Unavailable

 

                    MAURICIO JOSÉ MD  Unavailable         Unavailable

 

                    MAURICIO JOSÉ MD  Unavailable         Unavailable

 

                    Stephanie Capellan      Unavailable         [phone]



                                  



Re-disclosure Warning

          The records that you are about to access may contain information from 
federally-assisted alcohol or drug abuse programs. If such information is 
present, then the following federally mandated warning applies: This information
has been disclosed to you from records protected by federal confidentiality 
rules (42 CFR part 2). The federal rules prohibit you from making any further 
disclosure of this information unless further disclosure is expressly permitted 
by the written consent of the person to whom it pertains or as otherwise 
permitted by 42 CFR part 2. A general authorization for the release of medical 
or other information is NOT sufficient for this purpose. The Federal rules 
restrict any use of the information to criminally investigate or prosecute any 
alcohol or drug abuse patient.The records that you are about to access may 
contain highly sensitive health information, the redisclosure of which is 
protected by Article 27-F of the [facility] Public Health law. If you 
continue you may have access to information: Regarding HIV / AIDS; Provided by 
facilities licensed or operated by the [facility] Office of Mental Health; 
or Provided by the [facility] Office for People With Developmental 
Disabilities. If such information is present, then the following [place] 
mandated warning applies: This information has been disclosed to you from 
confidential records which are protected by state law. State law prohibits you 
from making any further disclosure of this information without the specific 
written consent of the person to whom it pertains, or as otherwise permitted by 
law. Any unauthorized further disclosure in violation of state law may result in
a fine or senior living sentence or both. A general authorization for the release of 
medical or other information is NOT sufficient authorization for further disc
losure.                                                                         
    



Allergies and Adverse Reactions

          



           Type       Description Substance  Reaction   Status     Data Source(s

)

 

                    Propensity to adverse reactions to substance nkda           

     24 HR Bupropion Hydrochloride 

150 MG Extended Release Oral Tablet                     Active              [facility]

[facility] ([facility])



                                                                                
       



Family History

          



             Family Member Name Family Member Gender Family Member Status Date o

f Status 

Description                             Data Source(s)

 

           Unknown    Male       Problem                          [facility] ([facility]



                                                                                
       



Encounters

          



           Encounter  Providers  Location   Date       Indications Data Source(s

)

 

                    Extended Individual Psychotherapy  - 45  min Attender: Klever Gibson [facility]         2021 01:15:00 AM EST - 2021 01:15:00 AM EST     

                [facility] 

([facility])

 

                      Attender: Bill [facility]            2021 12:00:00 AM

 EST            [facility] ([facility])

 

                    Brief Individual Psychotherapy - 30 min Attender: Bill greer [facility]         01/15/2021 02:30:00 AM EST - 01/15/2021 02:30:00 AM EST     

                [facility] 

([facility])

 

                      Attender: Bill [facility]            01/15/2021 12:00:00 AM

 EST            [facility] ([facility])

 

                    Psychiatric Diagnostic Evaluation (Non-Medical) Attender: Suzy villanueva Paige 

[facility] 2021 09:00:00 AM EST - 2021 09:00:00 AM EST   

                  

[facility] ([facility])

 

                      Attender: Bill [facility]            2021 12:00:00 AM

 EST            [facility] ([facility])

 

                    [facility] - 30 min [facility]         2020 02:00:00 AM EST - 2020 02:00:00 AM EST     

                [facility] 

([facility])

 

                      Attender: Bill [facility]            2020 12:00:00 AM

 EST            [facility] ([facility])

 

                    Brief Individual Psychotherapy - 30 min Attender: Stephanie sousa [facility]                03/10/2020 02:00:00 AM EDT - 03/10/2020 02:00:00 AM EDT     

                [facility] ([facility])

 

                      Attender: Stephanie Capellan            03/10/2020 12:00:00 AM E

DT            [facility] ([facility])

 

           Outpatient Attender: JUDIE MELGAR         2020 09:01:13 P

M EST            [facility]

 

                      Attender: Stephanie Capellan            2020 12:00:00 AM E

ST            [facility] ([facility])

 

                    Brief Individual Psychotherapy - 30 min Attender: Stephanie sousa [facility]                2020 12:15:00 PM EST - 2020 12:15:00 PM EST     

                [facility] ([facility])



                                                                                
                                                                                
                                              



Medications

          



          Medication Brand Name Start Date Product Form Dose      Route     Admi

nistrative 

Instructions Pharmacy Instructions Status     Indications Reaction   Description

 Data 

Source(s)

 

                    Sodium Chloride 0.111 MEQ/ML Nasal Solution Nasal Moist     

    2020 12:00:00 AM 

EST                                       active                      [facility] ([facility])

 

                                        Acetaminophen 325 MG / Chlorpheniramine 

Maleate 2 MG / Phenylephrine 

Hydrochloride 5 MG Oral Tablet Medicidin-D 2020 12:00:00 AM EST           

                              

completed                                                       [facility] ([facility])

 

                    Vitamin A 24282 UNT / Vitamin D 400 UNT Oral Capsule A & D  

             2020 12:00:00 

AM EST                                    active                      [facility] ([facility])

 

           Clotrimazole 10 MG/ML Topical Cream Anti-Fungal 2020 12:00:00 A

M EST                        

                              active                                  [facility] ([facility])

 

          Amoxicillin 875 MG Oral Tablet Amoxicillin 2020 12:00:00 AM EST 

                    ORAL       

                      active                                      [facility] ([facility])

 

                Escitalopram 10 MG Oral Tablet Escitalopram Oxalate 2020 1

2:00:00 AM EST  

                ORAL                    active                          [facility] (

[facility])

 

        topiramate 50 MG Oral Tablet Topiramate 2020 12:00:00 AM EST      

           ORAL                    

active                                                          [facility] ([facility])

 

           Cholecalciferol 2000 UNT Oral Tablet Vitamin D3 2020 12:00:00 A

M EST                       

ORAL                          active                                  [facility] ([facility])

 

                atorvastatin 10 MG Oral Tablet Atorvastatin Calcium 2020 1

2:00:00 AM EST  

                ORAL                    active                          [facility] (

[facility])

 

                    Clonidine Hydrochloride 0.1 MG Oral Tablet Clonidine HCL    

   2020 12:00:00 AM 

EST                  ORAL                 active                      [facility] ([facility])

 

        Mirtazapine 30 MG Oral Tablet Mirtazapine 2020 12:00:00 AM EST    

             ORAL            

             active                                              [facility] ([facility])

 

                    12 HR Carbamazepine 100 MG Extended Release Oral Capsule Car

bamazepine ER    

2020 12:00:00 AM EST               ORAL                 active            

          [facility] ([facility])



                                                                                
                                                                                
                



Insurance Providers

          



             Payer name   Policy type / Coverage type Policy ID    Covered party

 ID Covered 

party's relationship to muñoz Policy Muñoz             Plan Information

 

          [place] [place] [facility] [place]           [id number]           [place]           

       [id number]

 

          [facility] P         [id number]           S      

             [id number]

 

          Medicaid  S         [id number]            S                   [id number]

 

          Managed [facility] P         [id number]           S      

             [id number]

 

          Managed Care [facility] P         [id number]           S      

             [id number]

 

          [employer] [facility]           [id number]           SP           

       [id number]

 

          MEDICAID            [id number]            SP                  [id number]

 

          Managed Care [facility] P         [id number]           S      

             [id number]

 

          Medicaid [place] Medicaid  [id number]            Self                [id number]

 

          SELF PAY ONLY           [id number]            SP                  [id number]

00

 

          [facility] O         [id number]           S              

     [id number]

 

          MEDICAID  M         [id number]            S                   [id number]

 

          SELF PAY            UNAVAILABLE           SP                  UNAVAILA

BLE



                                                                                
                                                                                
                                              



Problems, Conditions, and Diagnoses

          



           Code       Display Name Description Problem Type Effective Dates Data

 Source(s)

 

             F12.10       Cannabis abuse, uncomplicated Cannabis Use Disorder, M

ild Condition    

2021 12:00:00 AM EST              [facility] ([facility])

 

                    F17.200             Nicotine dependence, unspecified, uncomp

licated Tobacco Use Disorder, 

Moderate            Condition           2021 12:00:00 AM EST [facility] ([facility])

 

                          F28                       Other psychotic disorder not

 due to a substance or known physiological 

condition                 Other Specified Schizophrenia Spectrum and Other Psych

otic Disorder 

Condition                 2021 12:00:00 AM EST [facility] ([facility])

 

             F41.1        Generalized anxiety disorder Generalized Anxiety Disor

dayday Condition    

03/10/2020 12:00:00 AM EDT              [facility] ([facility])



                                                                                
                                               



Surgeries/Procedures

          



             Procedure    Description  Date         Indications  Data Source(s)

 

                    Extended Individual Psychotherapy  - 45  min                

     2021 12:00:00 AM EST - 

2021 12:00:00 AM EST                           [facility] ([facility])

 

             Extended Individual Psychotherapy  - 45  min               12:00:00 AM EST              

[facility] ([facility])

 

                    Brief Individual Psychotherapy - 30 min                     

01/15/2021 12:00:00 AM EST - 

01/15/2021 12:00:00 AM EST                           [facility] ([facility])

 

             Brief Individual Psychotherapy - 30 min              01/15/2021 12:

00:00 AM EST              [facility]

([facility])

 

                    Psychiatric Diagnostic Evaluation (Non-Medical)             

        2021 12:00:00 AM EST - 

2021 12:00:00 AM EST                           [facility] ([facility])

 

             Psychiatric Diagnostic Evaluation (Non-Medical)              2021 12:00:00 AM EST              

[facility] ([facility])

 

                    Brief Individual Psychotherapy - 30 min                     

2020 12:00:00 AM EST - 

2020 12:00:00 AM EST                           [facility] ([facility])

 

             Brief Individual Psychotherapy - 30 min              2020 12:

00:00 AM EST              [facility]

([facility])

 

                    Brief Individual Psychotherapy - 30 min                     

03/10/2020 12:00:00 AM EDT - 

03/10/2020 12:00:00 AM EDT                           [facility] ([facility])

 

             Brief Individual Psychotherapy - 30 min              03/10/2020 12:

00:00 AM EDT              [facility]

([facility])

 

                    Brief Individual Psychotherapy - 30 min                     

2020 12:00:00 AM EST - 

2020 12:00:00 AM EST                           [facility] ([facility])

 

             Brief Individual Psychotherapy - 30 min              2020 12:

00:00 AM EST              [facility]

([facility])



                                                                                
                                                                                
                                    



Social History

          



           Code       Duration   Value      Status     Description Data Source(s

)

 

             Smoking      2021 12:00:00 AM EST Unknown if ever smoked comp

leted    Unknown if 

ever smoked                             [facility] ([facility])

 

             Smoking      01/15/2021 12:00:00 AM EST Unknown if ever smoked comp

leted    Unknown if 

ever smoked                             [facility] ([facility])

 

             Smoking      2021 12:00:00 AM EST Unknown if ever smoked comp

leted    Unknown if 

ever smoked                             [facility] ([facility])

 

             Smoking      2020 12:00:00 AM EST Unknown if ever smoked comp

leted    Unknown if 

ever smoked                             [facility] ([facility])

 

             Smoking      03/10/2020 12:00:00 AM EDT Unknown if ever smoked comp

leted    Unknown if 

ever smoked                             [facility] ([facility])

 

             Smoking      2020 12:00:00 AM EST Unknown if ever smoked comp

leted    Unknown if 

ever smoked                             [facility] ([facility])



                                                                                
                                                                   



Vital Signs

          



                    ID                  Date                Data Source

 

                    UNK                                      









           Name       Value      Range      Interpretation Code Description Data

 Source(s)

 

           Body weight 194.00 [lb_av]                       194.00 [lb_av] MEDEN

T ([facility])

 

           Body temperature 97.4 [degF]                       97.4 [degF] MEDENT

 ([facility])

 

           Respiratory rate 18 /min                          18 /min    MEDENT (

[facility])

 

           Heart rate 70 /min                          70 /min    MEDENT ([facility])

 

           Diastolic blood pressure 69 mm[Hg]                        69 mm[Hg]  

MEDENT ([facility])

 

           Systolic blood pressure 104 mm[Hg]                       104 mm[Hg] M

EDENT ([facility])

## 2021-02-11 NOTE — CCD
Patient Summary Document

                             Created on: 2021



Wiliam Martinez

External Reference #: [id number]

: 1975

Sex: Male



Demographics





                          Address                   [address] [place] [address]  [address]

 

                          Home Phone                [phone]

 

                          Preferred Language        Unknown

 

                          Marital Status            Unknown

 

                          Taoist Affiliation     Unknown

 

                          Race                      Unknown

 

                          Ethnic Group              Unknown





Author





                          Author                    Wiliam Gibson

 

                          Organization              Unknown

 

                          Address                   [address]  [address]



 

                          Phone                     [phone]







Care Team Providers





                    Care Team Member Name Role                Phone

 

                    Bill Gibson     PCP                 [phone]







Allergies, Adverse Reactions, Alerts





        Concept Allergy Name Reaction Severity Onset Date Status  Documentation 

Date 

Phone Number Npid         Taxonomy Code Taxonomy Desc Author Last Name Author Stephen

rst Name

                                        Concept Type

 

     900256 nkda                Active 2018                               

RXNORM







Problem List





          Concept   Problem Description Status    Start Date Created Date Resolv

ed Date Snomed 

Code

 

                F28             Other Specified Schizophrenia Spectrum and Other

 Psychotic Disorder Active           

                    2021                               

 

          F17.200   Tobacco Use Disorder, Moderate Active                          

 

          F12.10    Cannabis Use Disorder, Mild Active              2021  

          







Medications





                                        No Data in Section







Social History





                Social History Element Description     Concept         Effective

 Date

 

                Smoking Status  Unknown if ever smoked [id number]       [id number]







Immunizations





                                        No Data in Section







Vital Signs





                                        No Data in Section







Procedures





           Date       Concept Id Description Targeted Site Concept Targeted Site

 Concept Type

 

           2021 13788      Psychiatric Diagnostic Evaluation (Non-Medical)

                       CPT







                                        Patient has no history of implantable de

vices







Encounters





          Encounter Start Date End Date  Encounter Type Description Diagnosis Di

agnosis 

Desc       Location   Author First Name Author Last Name Npid       Taxonomy Cod

e Taxonomy 

Desc       Phone Number Location Addr1 Location Addr2 Location [facility] Location [facility] 

Location [facility]

 

             [id number]       2021   [address]        Psychiatric Darlene

gnostic Evaluation 

(Non-Medical)             F28                       Other Specified Schizophreni

a Spectrum and Other Psychotic 

Disorder     [facility] Paige Khan       [address]

[id number]   

[id number]  [id number] [address]        

[id number]







Plan of Treatment 





                                        No Data in Section



                                        



Lab Results





                                        No Data in Section







Instructions





                                        No Data in Section







Insurance Providers





                Insurance Id    Policy Effective Date Policy Thru Date Company N

[employer]

 

                [id number]        2020                      [employer]

## 2021-02-11 NOTE — CCD
Patient Summary Document

                             Created on: 01/15/2021



Wiliam Martinez

External Reference #: [id number]

: 1975

Sex: Male



Demographics





                          Address                   [address] [place] [address]  [address]

 

                          Home Phone                [phone]

 

                          Preferred Language        Unknown

 

                          Marital Status            Unknown

 

                          Christian Affiliation     Unknown

 

                          Race                      Unknown

 

                          Ethnic Group              Unknown





Author





                          Author                    Wiliam Gibson

 

                          Organization              Unknown

 

                          Address                   [address]  [address]



 

                          Phone                     [phone]







Care Team Providers





                    Care Team Member Name Role                Phone

 

                    Bill Gibson     PCP                 [phone]







Allergies, Adverse Reactions, Alerts





        Concept Allergy Name Reaction Severity Onset Date Status  Documentation 

Date 

Phone Number Npid         Taxonomy Code Taxonomy Desc Author Last Name Author Stephen

rst Name

                                        Concept Type

 

     027806 nkda                Active 2018                               

RXNORM







Problem List





          Concept   Problem Description Status    Start Date Created Date Resolv

ed Date Snomed 

Code

 

                F28             Other Specified Schizophrenia Spectrum and Other

 Psychotic Disorder Active           

                    01/15/2021                               

 

          F17.200   Tobacco Use Disorder, Moderate Active              01/15/202

1            

 

          F12.10    Cannabis Use Disorder, Mild Active              01/15/2021  

          







Medications





                                        No Data in Section







Social History





                Social History Element Description     Concept         Effective

 Date

 

                Smoking Status  Unknown if ever smoked [id number]       [id number]







Immunizations





                                        No Data in Section







Vital Signs





                                        No Data in Section







Procedures





           Date       Concept Id Description Targeted Site Concept Targeted Site

 Concept Type

 

           01/15/2021 36326      Brief Individual Psychotherapy - 30 min        

               CPT







                                        Patient has no history of implantable de

vices







Encounters





          Encounter Start Date End Date  Encounter Type Description Diagnosis Di

agnosis 

Desc       Location   Author First Name Author Last Name Npid       Taxonomy Cod

e Taxonomy 

Desc       Phone Number Location Addr1 Location Addr2 Location [facility] Location [facility] 

Location [facility]

 

          [id number]    01/15/2021 01/15/2021 14413     Brief Individual Psychothera

py - 30 min F28       

Other Specified Schizophrenia Spectrum and Other Psychotic Disorder [facility] Paige Khan     [id number] Social Wo

rker 

[id number]   [address]           [phone]







Plan of Treatment 





                                        No Data in Section



                                        



Lab Results





                                        No Data in Section







Instructions





                                        No Data in Section







Insurance Providers





                Insurance Id    Policy Effective Date Policy Thru Date Company N

[employer]

 

                [id number]        2020                      [employer]

## 2021-02-12 VITALS — SYSTOLIC BLOOD PRESSURE: 130 MMHG | DIASTOLIC BLOOD PRESSURE: 80 MMHG

## 2021-02-12 VITALS — OXYGEN SATURATION: 93 %

## 2021-02-12 LAB
ALBUMIN SERPL BCG-MCNC: 2.8 GM/DL (ref 3.2–5.2)
ALT SERPL W P-5'-P-CCNC: 20 U/L (ref 12–78)
BASOPHILS # BLD AUTO: 0.1 10^3/UL (ref 0–0.2)
BASOPHILS NFR BLD AUTO: 0.7 % (ref 0–1)
BILIRUB SERPL-MCNC: 0.6 MG/DL (ref 0.2–1)
BUN SERPL-MCNC: 13 MG/DL (ref 7–18)
CALCIUM SERPL-MCNC: 8.7 MG/DL (ref 8.5–10.1)
CHLORIDE SERPL-SCNC: 101 MEQ/L (ref 98–107)
CO2 SERPL-SCNC: 32 MEQ/L (ref 21–32)
CREAT SERPL-MCNC: 1.34 MG/DL (ref 0.7–1.3)
EOSINOPHIL # BLD AUTO: 0.1 10^3/UL (ref 0–0.5)
EOSINOPHIL NFR BLD AUTO: 1.1 % (ref 0–3)
GFR SERPL CREATININE-BSD FRML MDRD: > 60 ML/MIN/{1.73_M2} (ref 60–?)
GLUCOSE SERPL-MCNC: 105 MG/DL (ref 70–100)
HCT VFR BLD AUTO: 47.3 % (ref 42–52)
HGB BLD-MCNC: 15 G/DL (ref 13.5–17.5)
LYMPHOCYTES # BLD AUTO: 2.8 10^3/UL (ref 1.5–5)
LYMPHOCYTES NFR BLD AUTO: 20.8 % (ref 24–44)
MCH RBC QN AUTO: 28.9 PG (ref 27–33)
MCHC RBC AUTO-ENTMCNC: 31.7 G/DL (ref 32–36.5)
MCV RBC AUTO: 91.1 FL (ref 80–96)
MONOCYTES # BLD AUTO: 2.1 10^3/UL (ref 0–0.8)
MONOCYTES NFR BLD AUTO: 15.9 % (ref 0–5)
NEUTROPHILS # BLD AUTO: 8.1 10^3/UL (ref 1.5–8.5)
NEUTROPHILS NFR BLD AUTO: 61 % (ref 36–66)
PLATELET # BLD AUTO: 259 10^3/UL (ref 150–450)
POTASSIUM SERPL-SCNC: 3.9 MEQ/L (ref 3.5–5.1)
PROT SERPL-MCNC: 6.8 GM/DL (ref 6.4–8.2)
RBC # BLD AUTO: 5.19 10^6/UL (ref 4.3–6.1)
SODIUM SERPL-SCNC: 138 MEQ/L (ref 136–145)
WBC # BLD AUTO: 13.2 10^3/UL (ref 4–10)

## 2021-02-12 NOTE — REPVR
PROCEDURE INFORMATION: 

Exam: XR Chest, 1 View 

Exam date and time: 2/12/2021 1:14 AM 

Age: 45 years old 

Clinical indication: Cough; Additional info: Cough, fatigue 



TECHNIQUE: 

Imaging protocol: XR of the chest 

Views: 1 view. 



COMPARISON: 

No relevant prior studies available. 



FINDINGS: 

Lungs: Unremarkable. No consolidation. 

Pleural spaces: Unremarkable. No pleural effusion. No pneumothorax. 

Heart/Mediastinum: Unremarkable. No cardiomegaly. 

Bones/joints: Unremarkable. 



IMPRESSION: 

No acute findings. 



Electronically signed by: Micah Gaines On 02/12/2021  02:32:38 AM

## 2021-02-12 NOTE — CCD
Summarization Of Episode

                             Created on: 2021



JOSIE KWOK

External Reference #: [id number]

: 1975

Sex: Male



Demographics





                          Address                   [address] [place] [address]

 

                          Home Phone                [phone]

 

                          Preferred Language        English

 

                          Marital Status            Single

 

                          [place]     [place]

 

                          Race                      White

 

                          Ethnic Group              Not  or 





Author





                          Author                    [facility]

 

                          Organization              [facility]

 

                          Address                   Unknown

 

                          Phone                     Unavailable







Support





                Name            Relationship    Address         Phone

 

                    Judie José MD   Next Of Kin         [address] [place] [address]                    [phone]

 

                    Jose Blancas MD    Next Of Kin         [address] [place] [address]                    [phone]

 

                    Jing Mendoza DDS   Next Of Kin         [address], [place] [id number] [phone]

 

                    [employer] Next Of Kin         [address] [place] [address]                    [phone]

 

                UE              Next Of Kin     Unknown         Unavailable

 

                    PAOLA HOLLAND      Next Of Kin         [address] [place] [address]                    [phone]







Care Team Providers





                    Care Team Member Name Role                Phone

 

                    LaBarge,  Bill    Unavailable         [phone]

 

                    MAURICIO JOSÉ MD  Unavailable         Unavailable

 

                    MAURICIO JOSÉ MD  Unavailable         Unavailable

 

                    MAURICIO JOSÉ MD  Unavailable         Unavailable

 

                    MAURICIO JOSÉ MD  Unavailable         Unavailable

 

                    MAURICIO JOSÉ MD  Unavailable         Unavailable

 

                    MAURICIO JOSÉ MD  Unavailable         Unavailable

 

                    MAURICIO JOSÉ MD  Unavailable         Unavailable

 

                    MAURICIO JOSÉ MD  Unavailable         Unavailable

 

                    MAURICIO JOSÉ MD  Unavailable         Unavailable

 

                    MAURICIO JOSÉ MD  Unavailable         Unavailable

 

                    MAURICIO JOSÉ MD  Unavailable         Unavailable

 

                    MAURICIO JOSÉ MD  Unavailable         Unavailable

 

                    MAURICIO JOSÉ MD  Unavailable         Unavailable

 

                    MAURICIO JOSÉ MD  Unavailable         Unavailable

 

                    MAURICIO JOSÉ MD  Unavailable         Unavailable

 

                    MAURICIO JOSÉ MD  Unavailable         Unavailable

 

                    MAURICIO JOSÉ MD  Unavailable         Unavailable

 

                    MAURICIO JOSÉ MD  Unavailable         Unavailable

 

                    MAURICIO JOSÉ MD  Unavailable         Unavailable

 

                    MAURICIO JOSÉ MD  Unavailable         Unavailable

 

                    MAURICIO JOSÉ MD  Unavailable         Unavailable

 

                    MAURICIO JOSÉ MD  Unavailable         Unavailable

 

                    MAURICIO JOSÉ MD  Unavailable         Unavailable

 

                    MAURICIO JOSÉ MD  Unavailable         Unavailable

 

                    MAURICIO JOSÉ MD  Unavailable         Unavailable

 

                    MAURICIO JOSÉ MD  Unavailable         Unavailable

 

                    MAURICIO JOSÉ MD  Unavailable         Unavailable

 

                    MAURICIO JOSÉ MD  Unavailable         Unavailable

 

                    MAURICIO JOSÉ MD  Unavailable         Unavailable

 

                    MAURICIO JOSÉ MD  Unavailable         Unavailable

 

                    MAURICIO JOSÉ MD  Unavailable         Unavailable

 

                    MAURICIO JOSÉ MD  Unavailable         Unavailable

 

                    MAURICIO JOSÉ MD  Unavailable         Unavailable

 

                    MAURICIO JOSÉ MD  Unavailable         Unavailable

 

                    MAURICIO JOSÉ MD  Unavailable         Unavailable

 

                    MAURICIO JOSÉ MD  Unavailable         Unavailable

 

                    MAURICIO JOSÉ MD  Unavailable         Unavailable

 

                    MAURICIO JOSÉ MD  Unavailable         Unavailable

 

                    JANNETTEMAURICIO DOYLE MD  Unavailable         Unavailable

 

                    JANNETTEMAURICIO DOYLE MD  Unavailable         Unavailable

 

                    MAURICIO JOSÉ MD  Unavailable         Unavailable

 

                    MAURICIO JOSÉ MD  Unavailable         Unavailable

 

                    MAURICIO JOSÉ MD  Unavailable         Unavailable

 

                    MAURICIO JOSÉ MD  Unavailable         Unavailable

 

                    MAURICIO JOSÉ MD  Unavailable         Unavailable

 

                    MAURICIO JOSÉ MD  Unavailable         Unavailable

 

                    MAURICIO JOSÉ MD  Unavailable         Unavailable

 

                    MAURICIO JOSÉ MD  Unavailable         Unavailable

 

                    MAURICIO JOSÉ MD  Unavailable         Unavailable

 

                    MAURICIO JOSÉ MD  Unavailable         Unavailable

 

                    MAURICIO JOSÉ MD  Unavailable         Unavailable

 

                    MAURICIO JOSÉ MD  Unavailable         Unavailable

 

                    MAURICIO JOSÉ MD  Unavailable         Unavailable

 

                    MAURICIO JOSÉ MD  Unavailable         Unavailable

 

                    MAURICIO JOSÉ MD  Unavailable         Unavailable

 

                    MAURICIO JOSÉ MD  Unavailable         Unavailable

 

                    MAURICIO JOSÉ MD  Unavailable         Unavailable

 

                    MAURICIO JOSÉ MD  Unavailable         Unavailable

 

                    MAURICIO JOSÉ MD  Unavailable         Unavailable

 

                    MAURICIO JOSÉ MD  Unavailable         Unavailable

 

                    MAURICIO JOSÉ MD  Unavailable         Unavailable

 

                    MAURICIO JOSÉ MD  Unavailable         Unavailable

 

                    MAURICIO JOSÉ MD  Unavailable         Unavailable

 

                    MAURICIO JOSÉ MD  Unavailable         Unavailable

 

                    MAURICIO JOSÉ MD  Unavailable         Unavailable

 

                    MAURICIO JOSÉ MD  Unavailable         Unavailable

 

                    MAURICIO JOSÉ MD  Unavailable         Unavailable

 

                    MAURICIO JOSÉ MD  Unavailable         Unavailable

 

                    MAURICIO JOSÉ MD  Unavailable         Unavailable

 

                    MAURICIO JOSÉ MD  Unavailable         Unavailable

 

                    MAURICIO JOSÉ MD  Unavailable         Unavailable

 

                    MAURICIO JOSÉ MD  Unavailable         Unavailable

 

                    MAURICIO JOSÉ MD  Unavailable         Unavailable

 

                    MAURICIO JOSÉ MD  Unavailable         Unavailable

 

                    MAURICIO JOSÉ MD  Unavailable         Unavailable

 

                    MAURICIO JOSÉ MD  Unavailable         Unavailable

 

                    MAURICIO JOSÉ MD  Unavailable         Unavailable

 

                    MAURICIO JOSÉ MD  Unavailable         Unavailable

 

                    MAURICIO JOSÉ MD  Unavailable         Unavailable

 

                    MAURICIO JOSÉ MD  Unavailable         Unavailable

 

                    MAURICIO JOSÉ MD  Unavailable         Unavailable

 

                    MAURICIO JOSÉ MD  Unavailable         Unavailable

 

                    MAURICIO JOSÉ MD  Unavailable         Unavailable

 

                    Stephanie Capellan      Unavailable         [phone]



                                  



Re-disclosure Warning

          The records that you are about to access may contain information from 
federally-assisted alcohol or drug abuse programs. If such information is 
present, then the following federally mandated warning applies: This information
has been disclosed to you from records protected by federal confidentiality 
rules (42 CFR part 2). The federal rules prohibit you from making any further 
disclosure of this information unless further disclosure is expressly permitted 
by the written consent of the person to whom it pertains or as otherwise 
permitted by 42 CFR part 2. A general authorization for the release of medical 
or other information is NOT sufficient for this purpose. The Federal rules 
restrict any use of the information to criminally investigate or prosecute any 
alcohol or drug abuse patient.The records that you are about to access may 
contain highly sensitive health information, the redisclosure of which is 
protected by Article 27-F of the [facility] Public [facility] law. If you 
continue you may have access to information: Regarding HIV / AIDS; Provided by 
facilities licensed or operated by the [facility] Office of Mental Health; 
or Provided by the [facility] Office for People With Developmental 
Disabilities. If such information is present, then the following [place] 
mandated warning applies: This information has been disclosed to you from 
confidential records which are protected by state law. State law prohibits you 
from making any further disclosure of this information without the specific 
written consent of the person to whom it pertains, or as otherwise permitted by 
law. Any unauthorized further disclosure in violation of state law may result in
a fine or [facility] sentence or both. A general authorization for the release of 
medical or other information is NOT sufficient authorization for further disc
losure.                                                                         
    



Allergies and Adverse Reactions

          



           Type       Description Substance  Reaction   Status     Data Source(s

)

 

                    Propensity to adverse reactions to substance nkda           

     24 HR Bupropion Hydrochloride 

150 MG Extended Release Oral Tablet                     Active              [facility]

[facility] ([facility])



                                                                                
       



Family History

          



             Family Member Name Family Member Gender Family Member Status Date o

f Status 

Description                             Data Source(s)

 

           Unknown    Male       Problem                          [facility] ([facility]



                                                                                
       



Encounters

          



           Encounter  Providers  Location   Date       Indications Data Source(s

)

 

                    Extended Individual Psychotherapy  - 45  min Attender: Klever Gibson [facility]         2021 01:15:00 AM EST - 2021 01:15:00 AM EST     

                [facility] 

([facility])

 

                      Attender: [facility]            2021 12:00:00 AM

 EST            [facility] ([facility])

 

                    Brief Individual Psychotherapy - 30 min Attender: Bill greer [facility]         01/15/2021 02:30:00 AM EST - 01/15/2021 02:30:00 AM EST     

                [facility] 

([facility])

 

                      Attender: Bill [facility]            01/15/2021 12:00:00 AM

 EST            [facility] ([facility])

 

                    Psychiatric Diagnostic Evaluation (Non-Medical) Attender: Suzy villanueva Paige 

[facility] 2021 09:00:00 AM EST - 2021 09:00:00 AM EST   

                  

[facility] ([facility])

 

                      Attender: Bill [facility]            2021 12:00:00 AM

 EST            [facility] ([facility])

 

                    [facility] - 30 [facility]         [facility] 02:[facility]00 AM EST - 2020 02:00:00 AM EST     

                [facility] 

([facility])

 

                      Attender: Bill [facility]            2020 12:00:00 AM

 EST            [facility] ([facility])

 

                    Brief Individual Psychotherapy - 30 min Attender: Stephanie sousa [facility]                03/10/2020 02:00:00 AM EDT - 03/10/2020 02:00:00 AM EDT     

                [facility] ([facility])

 

                      Attender: Stephanie Capellan            03/10/2020 12:00:00 AM E

DT            [facility] ([facility])

 

           Outpatient Attender: JUDIE MELGAR         2020 09:01:13 P

M EST            [facility]

 

                      Attender: Stephanie Capellan            2020 12:00:00 AM E

ST            [facility] ([facility])

 

                    Brief Individual Psychotherapy - 30 min Attender: Stephanie sousa [facility]                2020 12:15:00 PM EST - 2020 12:15:00 PM EST     

                [facility] ([facility])



                                                                                
                                                                                
                                              



Medications

          



          Medication Brand Name Start Date Product Form Dose      Route     Admi

nistrative 

Instructions Pharmacy Instructions Status     Indications Reaction   Description

 Data 

Source(s)

 

                    Sodium Chloride 0.111 MEQ/ML Nasal Solution Nasal Moist     

    2020 12:00:00 AM 

EST                                       active                      [facility] ([facility])

 

                                        Acetaminophen 325 MG / Chlorpheniramine 

Maleate 2 MG / Phenylephrine 

Hydrochloride 5 MG Oral Tablet Medicidin-D 2020 12:00:00 AM EST           

                              

completed                                                       [facility] ([facility])

 

                    Vitamin A 74288 UNT / Vitamin D 400 UNT Oral Capsule A & D  

             2020 12:00:00 

AM EST                                    active                      [facility] ([facility])

 

           Clotrimazole 10 MG/ML Topical Cream Anti-Fungal 2020 12:00:00 A

M EST                        

                              active                                  [facility] ([facility])

 

          Amoxicillin 875 MG Oral Tablet Amoxicillin 2020 12:00:00 AM EST 

                    ORAL       

                      active                                      [facility] ([facility])

 

                Escitalopram 10 MG Oral Tablet Escitalopram Oxalate 2020 1

2:00:00 AM EST  

                ORAL                    active                          [facility] (

[facility]

 

        topiramate 50 MG Oral Tablet Topiramate 2020 12:00:00 AM EST      

           ORAL                    

active                                                          [facility] ([facility])

 

           Cholecalciferol 2000 UNT Oral Tablet Vitamin D3 2020 12:00:00 A

M EST                       

ORAL                          active                                  [facility] ([facility])

 

                atorvastatin 10 MG Oral Tablet Atorvastatin Calcium 2020 1

2:00:00 AM EST  

                ORAL                    active                          [facility] (

[facility])

 

                    Clonidine Hydrochloride 0.1 MG Oral Tablet Clonidine HCL    

   2020 12:00:00 AM 

EST                  ORAL                 active                      [facility] ([facility])

 

        Mirtazapine 30 MG Oral Tablet Mirtazapine 2020 12:00:00 AM EST    

             ORAL            

             active                                              [facility] ([facility])

 

                    12 HR Carbamazepine 100 MG Extended Release Oral Capsule Car

bamazepine ER    

2020 12:00:00 AM EST               ORAL                 active            

          [facility] ([facility])



                                                                                
                                                                                
                



Insurance Providers

          



             Payer name   Policy type / Coverage type Policy ID    Covered party

 ID Covered 

party's relationship to muñoz Policy Muñoz             Plan Information

 

          [employer] [facility]           [id number]           SP           

       [id number]

 

          [facility]         [id number]           S      

             [id number]

 

          Medicaid  S         [id number]            S                   [id number]

 

          Managed Care [facility] P         [id number]           S      

             [id number]

 

          Managed Care [facility] P         [id number]           S      

             [id number]

 

          [employer] [facility]           [id number]           SP           

       [id number]

 

          MEDICAID            [id number]            SP                  [id number]

 

          Managed Care [facility] P         [id number]           S      

             [id number]

 

          Medicaid [place] Medicaid  [id number]            Self                [id number]

 

          SELF PAY ONLY           [id number]            SP                  [id number]

[id number]

 

          [facility] O         [id number]           S              

     [id number]

 

          MEDICAID  M         [id number]            S                   [id number]

 

          SELF PAY            UNAVAILABLE           SP                  UNAVAILA

BLE



                                                                                
                                                                                
                                              



Problems, Conditions, and Diagnoses

          



           Code       Display Name Description Problem Type Effective Dates Data

 Source(s)

 

             F12.10       Cannabis abuse, uncomplicated Cannabis Use Disorder, M

ild Condition    

2021 12:00:00 AM EST              [facility] ([facility])

 

                    F17.200             Nicotine dependence, unspecified, uncomp

licated Tobacco Use Disorder, 

Moderate            Condition           2021 12:00:00 AM EST [facility] ([facility])

 

                          F28                       Other psychotic disorder not

 due to a substance or known physiological 

condition                 Other Specified Schizophrenia Spectrum and Other Psych

otic Disorder 

Condition                 2021 12:00:00 AM EST [facility] ([facility])

 

             F41.1        Generalized anxiety disorder Generalized Anxiety Disor

dayday Condition    

03/10/2020 12:00:00 AM EDT              [facility] ([facility])



                                                                                
                                               



Surgeries/Procedures

          



             Procedure    Description  Date         Indications  Data Source(s)

 

                    Extended Individual Psychotherapy  - 45  min                

     2021 12:00:00 AM EST - 

2021 12:00:00 AM EST                           [facility] ([facility])

 

             Extended Individual Psychotherapy  - 45  min               12:00:00 AM EST              

[facility] ([facility])

 

                    Brief Individual Psychotherapy - 30 min                     

01/15/2021 12:00:00 AM EST - 

01/15/2021 12:00:00 AM EST                           [facility] ([facility])

 

             Brief Individual Psychotherapy - 30 min              01/15/2021 12:

00:00 AM EST              [facility]

([facility])

 

                    Psychiatric Diagnostic Evaluation (Non-Medical)             

        2021 12:00:00 AM EST - 

2021 12:00:00 AM EST                           [facility] ([facility])

 

             Psychiatric Diagnostic Evaluation (Non-Medical)              2021 12:00:00 AM EST              

[facility] ([facility])

 

                    Brief Individual Psychotherapy - 30 min                     

2020 12:00:00 AM EST - 

2020 12:00:00 AM EST                           [facility] ([facility])

 

             Brief Individual Psychotherapy - 30 min              2020 12:

00:00 AM EST              [facility]

([facility])

 

                    Brief Individual Psychotherapy - 30 min                     

03/10/2020 12:00:00 AM EDT - 

03/10/2020 12:00:00 AM EDT                           [facility] ([facility])

 

             Brief Individual Psychotherapy - 30 min              03/10/2020 12:

00:00 AM EDT              [facility]

([facility])

 

                    Brief Individual Psychotherapy - 30 min                     

2020 12:00:00 AM EST - 

2020 12:00:00 AM EST                           [facility] ([facility])

 

             Brief Individual Psychotherapy - 30 min              2020 12:

00:00 AM EST              [facility]

([facility])



                                                                                
                                                                                
                                    



Social History

          



           Code       Duration   Value      Status     Description Data Source(s

)

 

             Smoking      2021 12:00:00 AM EST Unknown if ever smoked comp

leted    Unknown if 

ever smoked                             [facility] ([facility])

 

             Smoking      01/15/2021 12:00:00 AM EST Unknown if ever smoked comp

leted    Unknown if 

ever smoked                             [facility] ([facility])

 

             Smoking      2021 12:00:00 AM EST Unknown if ever smoked comp

leted    Unknown if 

ever smoked                             [facility] ([facility])

 

             Smoking      2020 12:00:00 AM EST Unknown if ever smoked comp

leted    Unknown if 

ever smoked                             [facility] ([facility])

 

             Smoking      03/10/2020 12:00:00 AM EDT Unknown if ever smoked comp

leted    Unknown if 

ever smoked                             [facility] ([facility])

 

             Smoking      2020 12:00:00 AM EST Unknown if ever smoked comp

leted    Unknown if 

ever smoked                             [facility] ([facility])



                                                                                
                                                                   



Vital Signs

          



                    ID                  Date                Data Source

 

                    UNK                                      









           Name       Value      Range      Interpretation Code Description Data

 Source(s)

 

           Body weight 194.00 [lb_av]                       194.00 [lb_av] MEDEN

T ([facility])

 

           Body temperature 97.4 [degF]                       97.4 [degF] MEDENT

 ([facility])

 

           Respiratory rate 18 /min                          18 /min    MEDENT (

[facility])

 

           Heart rate 70 /min                          70 /min    MEDENT ([facility])

 

           Diastolic blood pressure 69 mm[Hg]                        69 mm[Hg]  

MEDENT ([facility])

 

           Systolic blood pressure 104 mm[Hg]                       104 mm[Hg] M

EDENT ([facility])

## 2021-06-26 ENCOUNTER — HOSPITAL ENCOUNTER (EMERGENCY)
Dept: HOSPITAL 53 - M ED | Age: 46
LOS: 1 days | Discharge: HOME | End: 2021-06-27
Payer: COMMERCIAL

## 2021-06-26 VITALS — BODY MASS INDEX: 35.42 KG/M2 | HEIGHT: 68 IN | WEIGHT: 233.69 LBS

## 2021-06-26 DIAGNOSIS — K29.70: Primary | ICD-10-CM

## 2021-06-26 DIAGNOSIS — K42.9: ICD-10-CM

## 2021-06-26 LAB
ALBUMIN SERPL BCG-MCNC: 3.9 GM/DL (ref 3.2–5.2)
ALT SERPL W P-5'-P-CCNC: 68 U/L (ref 12–78)
BASOPHILS # BLD AUTO: 0.1 10^3/UL (ref 0–0.2)
BASOPHILS NFR BLD AUTO: 0.9 % (ref 0–1)
BILIRUB CONJ SERPL-MCNC: < 0.1 MG/DL (ref 0–0.2)
BILIRUB SERPL-MCNC: 0.3 MG/DL (ref 0.2–1)
BUN SERPL-MCNC: 9 MG/DL (ref 7–18)
CALCIUM SERPL-MCNC: 9.6 MG/DL (ref 8.5–10.1)
CHLORIDE SERPL-SCNC: 105 MEQ/L (ref 98–107)
CO2 SERPL-SCNC: 30 MEQ/L (ref 21–32)
CREAT SERPL-MCNC: 0.98 MG/DL (ref 0.7–1.3)
EOSINOPHIL # BLD AUTO: 0.3 10^3/UL (ref 0–0.5)
EOSINOPHIL NFR BLD AUTO: 2.2 % (ref 0–3)
GFR SERPL CREATININE-BSD FRML MDRD: > 60 ML/MIN/{1.73_M2} (ref 60–?)
GLUCOSE SERPL-MCNC: 93 MG/DL (ref 70–100)
HCT VFR BLD AUTO: 48.7 % (ref 42–52)
HGB BLD-MCNC: 16.4 G/DL (ref 13.5–17.5)
LIPASE SERPL-CCNC: 271 U/L (ref 73–393)
LYMPHOCYTES # BLD AUTO: 2.6 10^3/UL (ref 1.5–5)
LYMPHOCYTES NFR BLD AUTO: 23.2 % (ref 24–44)
MCH RBC QN AUTO: 30.4 PG (ref 27–33)
MCHC RBC AUTO-ENTMCNC: 33.7 G/DL (ref 32–36.5)
MCV RBC AUTO: 90.2 FL (ref 80–96)
MONOCYTES # BLD AUTO: 1.4 10^3/UL (ref 0–0.8)
MONOCYTES NFR BLD AUTO: 12.5 % (ref 2–8)
NEUTROPHILS # BLD AUTO: 6.9 10^3/UL (ref 1.5–8.5)
NEUTROPHILS NFR BLD AUTO: 60.8 % (ref 36–66)
PLATELET # BLD AUTO: 301 10^3/UL (ref 150–450)
POTASSIUM SERPL-SCNC: 4.7 MEQ/L (ref 3.5–5.1)
PROT SERPL-MCNC: 8 GM/DL (ref 6.4–8.2)
RBC # BLD AUTO: 5.4 10^6/UL (ref 4.3–6.1)
SODIUM SERPL-SCNC: 139 MEQ/L (ref 136–145)
WBC # BLD AUTO: 11.4 10^3/UL (ref 4–10)

## 2021-06-26 PROCEDURE — 99285 EMERGENCY DEPT VISIT HI MDM: CPT

## 2021-06-26 PROCEDURE — 83690 ASSAY OF LIPASE: CPT

## 2021-06-26 PROCEDURE — 80048 BASIC METABOLIC PNL TOTAL CA: CPT

## 2021-06-26 PROCEDURE — 83605 ASSAY OF LACTIC ACID: CPT

## 2021-06-26 PROCEDURE — 81001 URINALYSIS AUTO W/SCOPE: CPT

## 2021-06-26 PROCEDURE — 80076 HEPATIC FUNCTION PANEL: CPT

## 2021-06-26 PROCEDURE — 96374 THER/PROPH/DIAG INJ IV PUSH: CPT

## 2021-06-26 PROCEDURE — 74177 CT ABD & PELVIS W/CONTRAST: CPT

## 2021-06-26 PROCEDURE — 85025 COMPLETE CBC W/AUTO DIFF WBC: CPT

## 2021-06-26 RX ADMIN — DIATRIZOATE MEGLUMINE AND DIATRIZOATE SODIUM SCH ML: 600; 100 SOLUTION ORAL; RECTAL at 22:47

## 2021-06-26 RX ADMIN — DIATRIZOATE MEGLUMINE AND DIATRIZOATE SODIUM SCH ML: 600; 100 SOLUTION ORAL; RECTAL at 22:17

## 2021-06-27 VITALS — SYSTOLIC BLOOD PRESSURE: 116 MMHG | DIASTOLIC BLOOD PRESSURE: 64 MMHG

## 2021-06-27 NOTE — REPVR
PROCEDURE INFORMATION: 

Exam: CT Abdomen And Pelvis With Contrast 

Exam date and time: 6/27/2021 12:30 AM 

Age: 46 years old 

Clinical indication: Abdominal pain; Periumbilical; Additional info: Umbilical 

hernia pain 



TECHNIQUE: 

Imaging protocol: Computed tomography of the abdomen and pelvis with contrast. 

Radiation optimization: All CT scans at this facility use at least one of these 

dose optimization techniques: automated exposure control; mA and/or kV 

adjustment per patient size (includes targeted exams where dose is matched to 

clinical indication); or iterative reconstruction. 

Contrast material: ISOVUE 370; Contrast volume: 100 ml; Contrast route: 

INTRAVENOUS (IV);  



COMPARISON: 

CR PORTABLE CHEST X-RAY 2/12/2021 1:12 AM 



FINDINGS: 

Mediastinal space: Circumferential thickening of the distal esophagus. 

Correlate clinically for evidence of esophagitis. 



Liver: Hepatomegaly and steatosis. 

Gallbladder and bile ducts: Normal. No calcified stones. No ductal dilation. 

Pancreas: Normal. No ductal dilation. 

Spleen: Normal. No splenomegaly. 

Adrenal glands: Normal. No mass. 

Kidneys and ureters: Normal. No hydronephrosis. 

Stomach and bowel: Diverticulosis of the colon. No evidence of acute 

diverticulitis. 

Appendix: No evidence of appendicitis. 



Intraperitoneal space: Unremarkable. No free air. No significant fluid 

collection. 

Vasculature: Unremarkable. No abdominal aortic aneurysm. 

Lymph nodes: Several borderline enlarged toi hepatis and foramen Natali 

lymph nodes. 

Urinary bladder: Unremarkable as visualized. 

Reproductive: Unremarkable as visualized. 

Bones/joints: Unremarkable. No acute fracture. 

Soft tissues: Fat containing paraumbilical and bilateral inguinal hernias. 



IMPRESSION: 

Large fat containing paraumbilical hernia.  No evidence of bowel obstruction.  

No clear evidence of omental infarct



Electronically signed by: Dax Leblanc On 06/27/2021  01:21:19 AM

## 2022-02-22 ENCOUNTER — HOSPITAL ENCOUNTER (EMERGENCY)
Dept: HOSPITAL 53 - M ED | Age: 47
Discharge: HOME | End: 2022-02-22
Payer: COMMERCIAL

## 2022-02-22 VITALS — HEIGHT: 68 IN | WEIGHT: 237 LBS | BODY MASS INDEX: 35.92 KG/M2

## 2022-02-22 VITALS — DIASTOLIC BLOOD PRESSURE: 76 MMHG | SYSTOLIC BLOOD PRESSURE: 127 MMHG

## 2022-02-22 DIAGNOSIS — Z79.899: ICD-10-CM

## 2022-02-22 DIAGNOSIS — M77.31: ICD-10-CM

## 2022-02-22 DIAGNOSIS — M77.32: ICD-10-CM

## 2022-02-22 DIAGNOSIS — L03.115: Primary | ICD-10-CM

## 2022-02-22 DIAGNOSIS — J45.909: ICD-10-CM

## 2022-02-22 LAB
BASOPHILS # BLD AUTO: 0.1 10^3/UL (ref 0–0.2)
BASOPHILS NFR BLD AUTO: 0.9 % (ref 0–1)
BUN SERPL-MCNC: 8 MG/DL (ref 7–18)
CALCIUM SERPL-MCNC: 8.5 MG/DL (ref 8.5–10.1)
CHLORIDE SERPL-SCNC: 104 MEQ/L (ref 98–107)
CO2 SERPL-SCNC: 28 MEQ/L (ref 21–32)
CREAT SERPL-MCNC: 0.88 MG/DL (ref 0.7–1.3)
CRP SERPL-MCNC: 8.16 MG/DL (ref 0–0.3)
EOSINOPHIL # BLD AUTO: 0.2 10^3/UL (ref 0–0.5)
EOSINOPHIL NFR BLD AUTO: 3.1 % (ref 0–3)
ERYTHROCYTE [SEDIMENTATION RATE] IN BLOOD BY WESTERGREN METHOD: 45 MM/HR (ref 0–15)
GFR SERPL CREATININE-BSD FRML MDRD: > 60 ML/MIN/{1.73_M2} (ref 60–?)
GLUCOSE SERPL-MCNC: 74 MG/DL (ref 70–100)
HCT VFR BLD AUTO: 39.3 % (ref 42–52)
HGB BLD-MCNC: 12.5 G/DL (ref 13.5–17.5)
LYMPHOCYTES # BLD AUTO: 1.7 10^3/UL (ref 1.5–5)
LYMPHOCYTES NFR BLD AUTO: 22.7 % (ref 24–44)
MCH RBC QN AUTO: 29.9 PG (ref 27–33)
MCHC RBC AUTO-ENTMCNC: 31.8 G/DL (ref 32–36.5)
MCV RBC AUTO: 94 FL (ref 80–96)
MONOCYTES # BLD AUTO: 1 10^3/UL (ref 0–0.8)
MONOCYTES NFR BLD AUTO: 14 % (ref 2–8)
NEUTROPHILS # BLD AUTO: 4.4 10^3/UL (ref 1.5–8.5)
NEUTROPHILS NFR BLD AUTO: 58.8 % (ref 36–66)
PLATELET # BLD AUTO: 237 10^3/UL (ref 150–450)
POTASSIUM SERPL-SCNC: 4 MEQ/L (ref 3.5–5.1)
RBC # BLD AUTO: 4.18 10^6/UL (ref 4.3–6.1)
SODIUM SERPL-SCNC: 135 MEQ/L (ref 136–145)
WBC # BLD AUTO: 7.4 10^3/UL (ref 4–10)

## 2022-02-22 PROCEDURE — 73600 X-RAY EXAM OF ANKLE: CPT

## 2022-02-22 PROCEDURE — 96361 HYDRATE IV INFUSION ADD-ON: CPT

## 2022-02-22 PROCEDURE — 86140 C-REACTIVE PROTEIN: CPT

## 2022-02-22 PROCEDURE — 87040 BLOOD CULTURE FOR BACTERIA: CPT

## 2022-02-22 PROCEDURE — 80048 BASIC METABOLIC PNL TOTAL CA: CPT

## 2022-02-22 PROCEDURE — 73630 X-RAY EXAM OF FOOT: CPT

## 2022-02-22 PROCEDURE — 96374 THER/PROPH/DIAG INJ IV PUSH: CPT

## 2022-02-22 PROCEDURE — 93970 EXTREMITY STUDY: CPT

## 2022-02-22 PROCEDURE — 85025 COMPLETE CBC W/AUTO DIFF WBC: CPT

## 2022-02-22 PROCEDURE — 99284 EMERGENCY DEPT VISIT MOD MDM: CPT

## 2022-02-22 PROCEDURE — 83605 ASSAY OF LACTIC ACID: CPT

## 2022-02-22 PROCEDURE — 85652 RBC SED RATE AUTOMATED: CPT

## 2022-02-28 ENCOUNTER — HOSPITAL ENCOUNTER (EMERGENCY)
Dept: HOSPITAL 53 - M ED | Age: 47
Discharge: ANOTHER HOSPITAL | End: 2022-02-28
Payer: COMMERCIAL

## 2022-02-28 VITALS — WEIGHT: 224.87 LBS | HEIGHT: 68 IN | BODY MASS INDEX: 34.08 KG/M2

## 2022-02-28 VITALS — SYSTOLIC BLOOD PRESSURE: 171 MMHG | DIASTOLIC BLOOD PRESSURE: 91 MMHG
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
## 2022-02-28 DIAGNOSIS — T59.811A: Primary | ICD-10-CM
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
## 2022-02-28 DIAGNOSIS — F17.210: ICD-10-CM

## 2022-02-28 DIAGNOSIS — T22.20XA: ICD-10-CM

## 2022-02-28 DIAGNOSIS — T31.10: ICD-10-CM

## 2022-02-28 DIAGNOSIS — J45.909: ICD-10-CM

## 2022-02-28 DIAGNOSIS — T20.10XA: ICD-10-CM

## 2022-02-28 DIAGNOSIS — T21.10XA: ICD-10-CM

## 2022-02-28 LAB
ALBUMIN SERPL BCG-MCNC: 3.7 GM/DL (ref 3.2–5.2)
ALT SERPL W P-5'-P-CCNC: 76 U/L (ref 12–78)
AMPHETAMINES UR QL SCN: POSITIVE
AMYLASE SERPL-CCNC: 47 U/L (ref 25–115)
APPEARANCE UR: CLEAR
APTT BLD: (no result) SECONDS (ref 25.9–37)
BACTERIA UR QL AUTO: NEGATIVE
BARBITURATES UR QL SCN: NEGATIVE
BASOPHILS NFR BLD MANUAL: 3 % (ref 0–1)
BENZODIAZ UR QL SCN: NEGATIVE
BILIRUB CONJ SERPL-MCNC: < 0.1 MG/DL (ref 0–0.2)
BILIRUB SERPL-MCNC: 0.3 MG/DL (ref 0.2–1)
BILIRUB UR QL STRIP.AUTO: NEGATIVE
BUN SERPL-MCNC: 15 MG/DL (ref 7–18)
BZE UR QL SCN: NEGATIVE
CALCIUM SERPL-MCNC: 9.3 MG/DL (ref 8.5–10.1)
CANNABINOIDS UR QL SCN: POSITIVE
CHLORIDE SERPL-SCNC: 105 MEQ/L (ref 98–107)
CK MB CFR.DF SERPL CALC: 0.45
CK MB SERPL-MCNC: 1.7 NG/ML (ref ?–3.6)
CK SERPL-CCNC: 376 U/L (ref 39–308)
CO2 SERPL-SCNC: 24 MEQ/L (ref 21–32)
CREAT SERPL-MCNC: 1.06 MG/DL (ref 0.7–1.3)
EOSINOPHIL NFR BLD MANUAL: 1 % (ref 0–3)
ETHANOL SERPL-MCNC: < 0.003 % (ref 0–0.01)
GFR SERPL CREATININE-BSD FRML MDRD: > 60 ML/MIN/{1.73_M2} (ref 60–?)
GLUCOSE SERPL-MCNC: 125 MG/DL (ref 70–100)
GLUCOSE UR QL STRIP.AUTO: NEGATIVE MG/DL
HCT VFR BLD AUTO: 50.3 % (ref 42–52)
HGB BLD-MCNC: 16.6 G/DL (ref 13.5–17.5)
HGB UR QL STRIP.AUTO: NEGATIVE
INR PPP: (no result)
KETONES UR QL STRIP.AUTO: NEGATIVE MG/DL
LEUKOCYTE ESTERASE UR QL STRIP.AUTO: NEGATIVE
LIPASE SERPL-CCNC: 239 U/L (ref 73–393)
LYMPHOCYTES NFR BLD MANUAL: 32 % (ref 16–44)
MCH RBC QN AUTO: 30.1 PG (ref 27–33)
MCHC RBC AUTO-ENTMCNC: 33 G/DL (ref 32–36.5)
MCV RBC AUTO: 91.3 FL (ref 80–96)
METHADONE UR QL SCN: NEGATIVE
MONOCYTES NFR BLD MANUAL: 10 % (ref 0–5)
MUCOUS THREADS URNS QL MICRO: (no result)
NEUTROPHILS NFR BLD MANUAL: 49 % (ref 28–66)
NITRITE UR QL STRIP.AUTO: NEGATIVE
OPIATES UR QL SCN: NEGATIVE
PCP UR QL SCN: NEGATIVE
PH UR STRIP.AUTO: 6 UNITS (ref 5–9)
PLATELET # BLD AUTO: 380 10^3/UL (ref 150–450)
PLATELET BLD QL SMEAR: NORMAL
POTASSIUM SERPL-SCNC: 5.1 MEQ/L (ref 3.5–5.1)
PROT SERPL-MCNC: 8 GM/DL (ref 6.4–8.2)
PROT UR QL STRIP.AUTO: NEGATIVE MG/DL
PROTHROMBIN TIME: (no result) SECONDS (ref 12.7–14.5)
RBC # BLD AUTO: 5.51 10^6/UL (ref 4.3–6.1)
RBC # UR AUTO: 0 /HPF (ref 0–3)
RSV RNA NPH QL NAA+PROBE: NEGATIVE
SODIUM SERPL-SCNC: 136 MEQ/L (ref 136–145)
SP GR UR STRIP.AUTO: 1.02 (ref 1–1.03)
SQUAMOUS #/AREA URNS AUTO: 0 /HPF (ref 0–6)
UROBILINOGEN UR QL STRIP.AUTO: 0.2 MG/DL (ref 0–2)
VARIANT LYMPHS NFR BLD MANUAL: 4 % (ref 0–5)
WBC # BLD AUTO: 12.7 10^3/UL (ref 4–10)
WBC #/AREA URNS AUTO: 2 /HPF (ref 0–3)

## 2022-02-28 PROCEDURE — 90471 IMMUNIZATION ADMIN: CPT

## 2022-02-28 PROCEDURE — 83690 ASSAY OF LIPASE: CPT

## 2022-02-28 PROCEDURE — 80307 DRUG TEST PRSMV CHEM ANLYZR: CPT

## 2022-02-28 PROCEDURE — 80076 HEPATIC FUNCTION PANEL: CPT

## 2022-02-28 PROCEDURE — 85025 COMPLETE CBC W/AUTO DIFF WBC: CPT

## 2022-02-28 PROCEDURE — 81001 URINALYSIS AUTO W/SCOPE: CPT

## 2022-02-28 PROCEDURE — 87631 RESP VIRUS 3-5 TARGETS: CPT

## 2022-02-28 PROCEDURE — 82150 ASSAY OF AMYLASE: CPT

## 2022-02-28 PROCEDURE — 90715 TDAP VACCINE 7 YRS/> IM: CPT

## 2022-02-28 PROCEDURE — 99291 CRITICAL CARE FIRST HOUR: CPT

## 2022-02-28 PROCEDURE — 71045 X-RAY EXAM CHEST 1 VIEW: CPT

## 2022-02-28 PROCEDURE — 82077 ASSAY SPEC XCP UR&BREATH IA: CPT

## 2022-02-28 PROCEDURE — 82553 CREATINE MB FRACTION: CPT

## 2022-02-28 PROCEDURE — 96374 THER/PROPH/DIAG INJ IV PUSH: CPT

## 2022-02-28 PROCEDURE — 80048 BASIC METABOLIC PNL TOTAL CA: CPT

## 2022-02-28 PROCEDURE — 93041 RHYTHM ECG TRACING: CPT

## 2022-02-28 PROCEDURE — 96361 HYDRATE IV INFUSION ADD-ON: CPT

## 2022-02-28 PROCEDURE — 85610 PROTHROMBIN TIME: CPT

## 2022-02-28 PROCEDURE — 94760 N-INVAS EAR/PLS OXIMETRY 1: CPT

## 2022-02-28 PROCEDURE — 82550 ASSAY OF CK (CPK): CPT

## 2022-02-28 PROCEDURE — 82375 ASSAY CARBOXYHB QUANT: CPT

## 2022-10-20 ENCOUNTER — HOSPITAL ENCOUNTER (OUTPATIENT)
Dept: HOSPITAL 53 - M LAB REF | Age: 47
End: 2022-10-20
Attending: PHYSICIAN ASSISTANT
Payer: MEDICAID

## 2022-10-20 DIAGNOSIS — K21.9: ICD-10-CM

## 2022-10-20 DIAGNOSIS — Z13.220: ICD-10-CM

## 2022-10-20 DIAGNOSIS — E55.9: ICD-10-CM

## 2022-10-20 DIAGNOSIS — E66.9: ICD-10-CM

## 2022-10-20 DIAGNOSIS — Z13.1: Primary | ICD-10-CM

## 2022-10-20 LAB
25(OH)D3 SERPL-MCNC: 21.9 NG/ML (ref 30–100)
ALBUMIN SERPL BCG-MCNC: 3.6 GM/DL (ref 3.2–5.2)
ALT SERPL W P-5'-P-CCNC: 65 U/L (ref 12–78)
BILIRUB SERPL-MCNC: 0.2 MG/DL (ref 0.2–1)
BUN SERPL-MCNC: 9 MG/DL (ref 7–18)
CALCIUM SERPL-MCNC: 8.9 MG/DL (ref 8.5–10.1)
CHLORIDE SERPL-SCNC: 105 MEQ/L (ref 98–107)
CHOLEST SERPL-MCNC: 231 MG/DL (ref ?–200)
CHOLEST/HDLC SERPL: 5.37 {RATIO} (ref ?–5)
CO2 SERPL-SCNC: 28 MEQ/L (ref 21–32)
CREAT SERPL-MCNC: 1.08 MG/DL (ref 0.7–1.3)
GFR SERPL CREATININE-BSD FRML MDRD: > 60 ML/MIN/{1.73_M2} (ref 60–?)
GLUCOSE SERPL-MCNC: 97 MG/DL (ref 70–100)
HCT VFR BLD AUTO: 45.5 % (ref 42–52)
HDLC SERPL-MCNC: 43 MG/DL (ref 40–?)
HGB BLD-MCNC: 14.8 G/DL (ref 13.5–17.5)
LDLC SERPL CALC-MCNC: 126 MG/DL (ref ?–100)
MCH RBC QN AUTO: 30 PG (ref 27–33)
MCHC RBC AUTO-ENTMCNC: 32.5 G/DL (ref 32–36.5)
MCV RBC AUTO: 92.1 FL (ref 80–96)
NONHDLC SERPL-MCNC: 188 MG/DL
PLATELET # BLD AUTO: 271 10^3/UL (ref 150–450)
POTASSIUM SERPL-SCNC: 4.4 MEQ/L (ref 3.5–5.1)
PROT SERPL-MCNC: 7.4 GM/DL (ref 6.4–8.2)
RBC # BLD AUTO: 4.94 10^6/UL (ref 4.3–6.1)
SODIUM SERPL-SCNC: 137 MEQ/L (ref 136–145)
TRIGL SERPL-MCNC: 309 MG/DL (ref ?–150)
TSH SERPL DL<=0.005 MIU/L-ACNC: 0.83 UIU/ML (ref 0.36–3.74)
WBC # BLD AUTO: 8.5 10^3/UL (ref 4–10)

## 2022-11-22 ENCOUNTER — HOSPITAL ENCOUNTER (OUTPATIENT)
Dept: HOSPITAL 53 - M RAD | Age: 47
End: 2022-11-22
Attending: PHYSICIAN ASSISTANT
Payer: COMMERCIAL

## 2022-11-22 DIAGNOSIS — R06.2: ICD-10-CM

## 2022-11-22 DIAGNOSIS — R06.00: ICD-10-CM

## 2022-11-22 DIAGNOSIS — F17.210: ICD-10-CM

## 2022-11-22 DIAGNOSIS — Z01.811: Primary | ICD-10-CM

## 2023-03-15 ENCOUNTER — HOSPITAL ENCOUNTER (OUTPATIENT)
Dept: HOSPITAL 53 - M LABSMTC | Age: 48
End: 2023-03-15
Attending: ANESTHESIOLOGY
Payer: COMMERCIAL

## 2023-03-15 DIAGNOSIS — Z01.812: Primary | ICD-10-CM

## 2023-03-20 ENCOUNTER — HOSPITAL ENCOUNTER (OUTPATIENT)
Dept: HOSPITAL 53 - M SDC | Age: 48
Discharge: HOME | End: 2023-03-20
Attending: SURGERY
Payer: COMMERCIAL

## 2023-03-20 VITALS — BODY MASS INDEX: 39.96 KG/M2 | HEIGHT: 69 IN | WEIGHT: 269.8 LBS

## 2023-03-20 VITALS — DIASTOLIC BLOOD PRESSURE: 81 MMHG | SYSTOLIC BLOOD PRESSURE: 142 MMHG

## 2023-03-20 DIAGNOSIS — K42.0: Primary | ICD-10-CM

## 2023-03-20 DIAGNOSIS — F17.210: ICD-10-CM

## 2023-03-20 DIAGNOSIS — E66.01: ICD-10-CM

## 2023-03-20 DIAGNOSIS — M62.08: ICD-10-CM

## 2023-03-20 PROCEDURE — 49594 RPR AA HRN 1ST 3-10 NCR/STRN: CPT

## 2023-03-20 RX ADMIN — HYDROMORPHONE HYDROCHLORIDE PRN MG: 1 INJECTION, SOLUTION INTRAMUSCULAR; INTRAVENOUS; SUBCUTANEOUS at 17:20

## 2023-03-20 RX ADMIN — HYDROMORPHONE HYDROCHLORIDE PRN MG: 1 INJECTION, SOLUTION INTRAMUSCULAR; INTRAVENOUS; SUBCUTANEOUS at 17:26

## 2023-05-11 ENCOUNTER — HOSPITAL ENCOUNTER (OUTPATIENT)
Dept: HOSPITAL 53 - M LAB REF | Age: 48
End: 2023-05-11
Attending: PHYSICIAN ASSISTANT
Payer: COMMERCIAL

## 2023-05-11 DIAGNOSIS — E55.9: Primary | ICD-10-CM

## 2023-05-11 DIAGNOSIS — F52.21: ICD-10-CM

## 2023-05-11 DIAGNOSIS — E78.5: ICD-10-CM

## 2023-05-11 LAB
25(OH)D3 SERPL-MCNC: 27.5 NG/ML (ref 20–100)
ALBUMIN SERPL BCG-MCNC: 3.4 G/DL (ref 3.2–5.2)
ALP SERPL-CCNC: 102 U/L (ref 46–116)
ALT SERPL W P-5'-P-CCNC: 95 U/L (ref 7–40)
AST SERPL-CCNC: 38 U/L (ref ?–34)
BILIRUB SERPL-MCNC: 0.4 MG/DL (ref 0.3–1.2)
BUN SERPL-MCNC: 10 MG/DL (ref 9–23)
CALCIUM SERPL-MCNC: 9.1 MG/DL (ref 8.5–10.1)
CHLORIDE SERPL-SCNC: 104 MMOL/L (ref 98–107)
CHOLEST SERPL-MCNC: 212 MG/DL (ref ?–200)
CHOLEST/HDLC SERPL: 4.47 {RATIO} (ref ?–5)
CO2 SERPL-SCNC: 30 MMOL/L (ref 20–31)
CREAT SERPL-MCNC: 0.91 MG/DL (ref 0.7–1.3)
GFR SERPL CREATININE-BSD FRML MDRD: > 60 ML/MIN/{1.73_M2} (ref 60–?)
GLUCOSE SERPL-MCNC: 119 MG/DL (ref 60–100)
HDLC SERPL-MCNC: 47.4 MG/DL (ref 40–?)
LDLC SERPL CALC-MCNC: 121 MG/DL (ref ?–100)
NONHDLC SERPL-MCNC: 164.6 MG/DL
POTASSIUM SERPL-SCNC: 4.1 MMOL/L (ref 3.5–5.1)
PROT SERPL-MCNC: 7 G/DL (ref 5.7–8.2)
SODIUM SERPL-SCNC: 138 MMOL/L (ref 136–145)
TESTOST SERPL-MCNC: 144 NG/DL (ref 241–827)
TRIGL SERPL-MCNC: 218 MG/DL (ref ?–150)

## 2023-07-18 ENCOUNTER — HOSPITAL ENCOUNTER (OUTPATIENT)
Dept: HOSPITAL 53 - M LAB REF | Age: 48
End: 2023-07-18
Attending: PHYSICIAN ASSISTANT
Payer: COMMERCIAL

## 2023-07-18 DIAGNOSIS — Z11.59: ICD-10-CM

## 2023-07-18 DIAGNOSIS — R73.01: Primary | ICD-10-CM

## 2023-07-18 LAB
ALBUMIN SERPL BCG-MCNC: 3.5 G/DL (ref 3.2–5.2)
ALP SERPL-CCNC: 99 U/L (ref 46–116)
ALT SERPL W P-5'-P-CCNC: 118 U/L (ref 7–40)
AST SERPL-CCNC: 40 U/L (ref ?–34)
BILIRUB SERPL-MCNC: 0.6 MG/DL (ref 0.3–1.2)
BUN SERPL-MCNC: 12 MG/DL (ref 9–23)
CALCIUM SERPL-MCNC: 9.1 MG/DL (ref 8.5–10.1)
CHLORIDE SERPL-SCNC: 100 MMOL/L (ref 98–107)
CO2 SERPL-SCNC: 28 MMOL/L (ref 20–31)
CREAT SERPL-MCNC: 0.89 MG/DL (ref 0.7–1.3)
EST. AVERAGE GLUCOSE BLD GHB EST-MCNC: 177 MG/DL (ref 60–110)
GFR SERPL CREATININE-BSD FRML MDRD: > 60 ML/MIN/{1.73_M2} (ref 56–?)
GLUCOSE SERPL-MCNC: 164 MG/DL (ref 60–100)
HCV AB SER QL: 1.27 INDEX (ref ?–0.8)
POTASSIUM SERPL-SCNC: 3.4 MMOL/L (ref 3.5–5.1)
PROT SERPL-MCNC: 7.5 G/DL (ref 5.7–8.2)
SODIUM SERPL-SCNC: 136 MMOL/L (ref 136–145)

## 2023-08-04 ENCOUNTER — HOSPITAL ENCOUNTER (OUTPATIENT)
Dept: HOSPITAL 53 - M SLEEP HO | Age: 48
End: 2023-08-04
Attending: PHYSICIAN ASSISTANT
Payer: COMMERCIAL

## 2023-08-04 DIAGNOSIS — R06.83: Primary | ICD-10-CM

## 2023-08-04 DIAGNOSIS — R40.0: ICD-10-CM

## 2023-08-04 DIAGNOSIS — G47.30: ICD-10-CM

## 2023-09-21 ENCOUNTER — HOSPITAL ENCOUNTER (OUTPATIENT)
Dept: HOSPITAL 53 - M LAB REF | Age: 48
End: 2023-09-21
Attending: PHYSICIAN ASSISTANT
Payer: COMMERCIAL

## 2023-09-21 DIAGNOSIS — E11.9: Primary | ICD-10-CM

## 2023-09-21 LAB
CHOLEST SERPL-MCNC: 223 MG/DL (ref ?–200)
CHOLEST/HDLC SERPL: 5.56 {RATIO} (ref ?–5)
CREAT UR-MCNC: 136.6 MG/DL
HDLC SERPL-MCNC: 40.1 MG/DL (ref 40–?)
LDLC SERPL CALC-MCNC: 112.3 MG/DL (ref ?–100)
MICROALBUMIN UR-MCNC: 5 MG/L
MICROALBUMIN/CREAT UR: 3.6 MCG/MG (ref 0–30)
NONHDLC SERPL-MCNC: 182.9 MG/DL
TRIGL SERPL-MCNC: 353 MG/DL (ref ?–150)

## 2024-09-12 ENCOUNTER — HOSPITAL ENCOUNTER (EMERGENCY)
Dept: HOSPITAL 53 - M ED | Age: 49
Discharge: HOME | End: 2024-09-12
Payer: MEDICAID

## 2024-09-12 VITALS — TEMPERATURE: 97.5 F | SYSTOLIC BLOOD PRESSURE: 120 MMHG | OXYGEN SATURATION: 96 % | DIASTOLIC BLOOD PRESSURE: 68 MMHG

## 2024-09-12 VITALS — HEIGHT: 68.5 IN | WEIGHT: 236.12 LBS | BODY MASS INDEX: 35.37 KG/M2

## 2024-09-12 DIAGNOSIS — F20.9: ICD-10-CM

## 2024-09-12 DIAGNOSIS — F12.10: ICD-10-CM

## 2024-09-12 DIAGNOSIS — F31.9: ICD-10-CM

## 2024-09-12 DIAGNOSIS — K21.9: ICD-10-CM

## 2024-09-12 DIAGNOSIS — E78.5: ICD-10-CM

## 2024-09-12 DIAGNOSIS — Z79.1: ICD-10-CM

## 2024-09-12 DIAGNOSIS — F17.210: ICD-10-CM

## 2024-09-12 DIAGNOSIS — Z79.899: ICD-10-CM

## 2024-09-12 DIAGNOSIS — Z79.51: ICD-10-CM

## 2024-09-12 DIAGNOSIS — L03.115: Primary | ICD-10-CM

## 2024-09-12 DIAGNOSIS — I10: ICD-10-CM

## 2024-09-12 DIAGNOSIS — Z79.2: ICD-10-CM

## 2024-09-12 DIAGNOSIS — F15.10: ICD-10-CM

## 2024-09-12 LAB
BUN SERPL-MCNC: 15 MG/DL (ref 9–23)
CALCIUM SERPL-MCNC: 9.5 MG/DL (ref 8.5–10.1)
CHLORIDE SERPL-SCNC: 107 MMOL/L (ref 98–107)
CO2 SERPL-SCNC: 27 MMOL/L (ref 20–31)
CREAT SERPL-MCNC: 0.94 MG/DL (ref 0.7–1.3)
CRP SERPL-MCNC: 1.9 MG/DL (ref ?–1)
EOSINOPHIL NFR BLD MANUAL: 2 % (ref 0–3)
ERYTHROCYTE [SEDIMENTATION RATE] IN BLOOD BY WESTERGREN METHOD: 46 MM/HR (ref 0–20)
GFR SERPL CREATININE-BSD FRML MDRD: > 60 ML/MIN/{1.73_M2} (ref 56–?)
GLUCOSE SERPL-MCNC: 171 MG/DL (ref 60–100)
HCT VFR BLD AUTO: 45.7 % (ref 42–52)
HGB BLD-MCNC: 15.3 G/DL (ref 13.5–17.5)
LYMPHOCYTES NFR BLD MANUAL: 36 % (ref 16–44)
MCH RBC QN AUTO: 30.4 PG (ref 27–33)
MCHC RBC AUTO-ENTMCNC: 33.5 G/DL (ref 32–36.5)
MCV RBC AUTO: 90.7 FL (ref 80–96)
MONOCYTES NFR BLD MANUAL: 7 % (ref 0–5)
NEUTROPHILS NFR BLD MANUAL: 55 % (ref 28–66)
PLATELET # BLD AUTO: 315 10^3/UL (ref 150–450)
PLATELET BLD QL SMEAR: NORMAL
POTASSIUM SERPL-SCNC: 3.9 MMOL/L (ref 3.5–5.1)
RBC # BLD AUTO: 5.04 10^6/UL (ref 4.3–6.1)
SODIUM SERPL-SCNC: 138 MMOL/L (ref 136–145)
WBC # BLD AUTO: 13 10^3/UL (ref 4–10)

## 2024-09-12 RX ADMIN — CEPHALEXIN ONE MG: 500 CAPSULE ORAL at 07:33

## 2025-01-20 ENCOUNTER — HOSPITAL ENCOUNTER (OUTPATIENT)
Dept: HOSPITAL 53 - M LAB REF | Age: 50
End: 2025-01-20
Attending: PHYSICIAN ASSISTANT
Payer: COMMERCIAL

## 2025-01-20 DIAGNOSIS — E11.9: Primary | ICD-10-CM

## 2025-01-20 LAB
ALBUMIN SERPL BCG-MCNC: 3.7 G/DL (ref 3.2–5.2)
ALP SERPL-CCNC: 95 U/L (ref 40–129)
ALT SERPL W P-5'-P-CCNC: 47 U/L (ref 7–40)
AST SERPL-CCNC: 18 U/L (ref ?–34)
BILIRUB SERPL-MCNC: 0.5 MG/DL (ref 0.3–1.2)
BUN SERPL-MCNC: 13 MG/DL (ref 9–23)
CALCIUM SERPL-MCNC: 9.2 MG/DL (ref 8.5–10.1)
CHLORIDE SERPL-SCNC: 104 MMOL/L (ref 98–107)
CHOLEST SERPL-MCNC: 219 MG/DL (ref ?–200)
CHOLEST/HDLC SERPL: 4.39 {RATIO} (ref ?–5)
CO2 SERPL-SCNC: 29 MMOL/L (ref 20–31)
CREAT SERPL-MCNC: 0.95 MG/DL (ref 0.7–1.3)
EST. AVERAGE GLUCOSE BLD GHB EST-MCNC: 186 MG/DL (ref 60–110)
GFR SERPL CREATININE-BSD FRML MDRD: > 60 ML/MIN/{1.73_M2} (ref 56–?)
GLUCOSE SERPL-MCNC: 161 MG/DL (ref 60–100)
HCT VFR BLD AUTO: 48.7 % (ref 42–52)
HDLC SERPL-MCNC: 49.8 MG/DL (ref 40–?)
HGB BLD-MCNC: 15.9 G/DL (ref 13.5–17.5)
LDLC SERPL CALC-MCNC: 125.4 MG/DL (ref ?–100)
MCH RBC QN AUTO: 29.4 PG (ref 27–33)
MCHC RBC AUTO-ENTMCNC: 32.6 G/DL (ref 32–36.5)
MCV RBC AUTO: 90 FL (ref 80–96)
NONHDLC SERPL-MCNC: 169.2 MG/DL
PLATELET # BLD AUTO: 278 10^3/UL (ref 150–450)
POTASSIUM SERPL-SCNC: 4.2 MMOL/L (ref 3.5–5.1)
PROT SERPL-MCNC: 8 G/DL (ref 5.7–8.2)
RBC # BLD AUTO: 5.41 10^6/UL (ref 4.3–6.1)
SODIUM SERPL-SCNC: 140 MMOL/L (ref 136–145)
TRIGL SERPL-MCNC: 219 MG/DL (ref ?–150)
WBC # BLD AUTO: 12.2 10^3/UL (ref 4–10)